# Patient Record
Sex: FEMALE | Race: BLACK OR AFRICAN AMERICAN | Employment: PART TIME | URBAN - NONMETROPOLITAN AREA
[De-identification: names, ages, dates, MRNs, and addresses within clinical notes are randomized per-mention and may not be internally consistent; named-entity substitution may affect disease eponyms.]

---
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
## 2020-07-01 ENCOUNTER — HOSPITAL ENCOUNTER (EMERGENCY)
Age: 20
Discharge: HOME | End: 2020-07-01
Payer: MEDICARE

## 2020-07-01 ENCOUNTER — APPOINTMENT (OUTPATIENT)
Dept: GENERAL RADIOLOGY | Age: 20
End: 2020-07-01
Payer: MEDICARE

## 2020-07-01 VITALS
TEMPERATURE: 98.4 F | RESPIRATION RATE: 16 BRPM | WEIGHT: 165 LBS | SYSTOLIC BLOOD PRESSURE: 139 MMHG | DIASTOLIC BLOOD PRESSURE: 86 MMHG | HEART RATE: 88 BPM | HEIGHT: 66 IN | OXYGEN SATURATION: 100 % | BODY MASS INDEX: 26.52 KG/M2

## 2020-07-01 PROCEDURE — 73610 X-RAY EXAM OF ANKLE: CPT

## 2020-07-01 PROCEDURE — 99213 OFFICE O/P EST LOW 20 MIN: CPT | Performed by: NURSE PRACTITIONER

## 2020-07-01 PROCEDURE — 99214 OFFICE O/P EST MOD 30 MIN: CPT

## 2020-07-01 RX ORDER — IBUPROFEN 400 MG/1
400 TABLET ORAL EVERY 6 HOURS PRN
Qty: 30 TABLET | Refills: 0 | Status: SHIPPED | OUTPATIENT
Start: 2020-07-01

## 2020-07-01 ASSESSMENT — ENCOUNTER SYMPTOMS
STRIDOR: 0
SHORTNESS OF BREATH: 0
WHEEZING: 0
COUGH: 0
COLOR CHANGE: 0
APNEA: 0
BACK PAIN: 0
CHEST TIGHTNESS: 0
CHOKING: 0

## 2020-07-01 NOTE — ED PROVIDER NOTES
[facility]  Urgent Care Encounter      CHIEF COMPLAINT       Chief Complaint   Patient presents with    Ankle Pain     Left       Nurses Notes reviewed and I agree except as noted in the HPI. HISTORY OFPRESENT ILLNESS   Meri ALESHIA Moore is a 21 y.o. The history is provided by the patient. No  was used. Ankle Problem   Location:  Ankle  Time since incident:  2 days  Injury: yes    Mechanism of injury comment:  Twisted stepping off porch at her home  Ankle location:  L ankle  Pain details:     Quality:  Aching    Radiates to:  Does not radiate    Severity:  Mild    Onset quality:  Sudden    Duration:  1 day    Timing:  Intermittent    Progression:  Improving  Chronicity:  New  Dislocation: no    Foreign body present:  No foreign bodies  Tetanus status:  Unknown  Prior injury to area:  No  Relieved by:  Nothing  Ineffective treatments:  Compression  Associated symptoms: no back pain, no decreased ROM, no fatigue, no fever, no itching, no muscle weakness, no neck pain, no numbness, no stiffness, no swelling and no tingling    Risk factors: no concern for non-accidental trauma, no frequent fractures, no known bone disorder, no obesity and no recent illness        REVIEW OF SYSTEMS     Review of Systems   Constitutional: Negative for activity change, appetite change, chills, diaphoresis, fatigue and fever. Respiratory: Negative for apnea, cough, choking, chest tightness, shortness of breath, wheezing and stridor. Cardiovascular: Negative for chest pain, palpitations and leg swelling. Musculoskeletal: Positive for joint swelling and myalgias. Negative for arthralgias, back pain, gait problem, neck pain, neck stiffness and stiffness. Skin: Negative for color change, itching, pallor, rash and wound. PAST MEDICAL HISTORY         Diagnosis Date    Asthma     Hypertension        SURGICAL HISTORY     Patient  has no past surgical history on file.     CURRENT MEDICATIONS       Previous Medications    LOSARTAN (COZAAR) 100 MG TABLET    TAKE 1 TABLET BY MOUTH EVERY DAY       ALLERGIES     Patient is has No Known Allergies. FAMILY HISTORY     Patient's family history includes High Blood Pressure in her father and mother. SOCIAL HISTORY     Patient  reports that she is a non-smoker but has been exposed to tobacco smoke. She has never used smokeless tobacco. She reports that she does not drink alcohol or use drugs. PHYSICAL EXAM     ED TRIAGE VITALS  BP: (!) 160/95, Temp: 98.4 °F (36.9 °C), Pulse: 93, Resp: 16, SpO2: 100 %  Physical Exam  Vitals signs and nursing note reviewed. Constitutional:       Appearance: Normal appearance. HENT:      Right Ear: External ear normal.      Left Ear: External ear normal.   Eyes:      Extraocular Movements: Extraocular movements intact. Conjunctiva/sclera: Conjunctivae normal.   Neck:      Musculoskeletal: Normal range of motion. Pulmonary:      Effort: Pulmonary effort is normal.   Musculoskeletal: Normal range of motion. Left ankle: She exhibits normal range of motion, no swelling, no ecchymosis, no deformity, no laceration and normal pulse. Tenderness. Achilles tendon normal.   Skin:     General: Skin is warm. Neurological:      General: No focal deficit present. Mental Status: She is alert and oriented to person, place, and time. Psychiatric:         Mood and Affect: Mood normal.         Behavior: Behavior normal.         Thought Content: Thought content normal.         Judgment: Judgment normal.         DIAGNOSTIC RESULTS   Labs:No results found for this visit on 07/01/20. IMAGING:  XR ANKLE LEFT (MIN 3 VIEWS)   Final Result   No acute fracture. Soft tissue swelling over the lateral malleolus. **This report has been created using voice recognition software. It may contain minor errors which are inherent in voice recognition technology. **      Final report electronically signed by  Suzan Garvin on 7/1/2020 2:41 PM        URGENT CARE COURSE:     Vitals:    07/01/20 1418   BP: (!) 160/95   Pulse: 93   Resp: 16   Temp: 98.4 °F (36.9 °C)   TempSrc: Temporal   SpO2: 100%   Weight: 165 lb (74.8 kg)   Height: 5' 6\" (1.676 m)       Medications - No data to display  PROCEDURES:  None  FINAL IMPRESSION      1. Sprain of left ankle, unspecified ligament, initial encounter        DISPOSITION/PLAN   Decision To Discharge    Compression with Wrap  Ice, elevation 15-20 minutes 3 times a day for the first 24-48 hours followed with heat 15-20 minutes 3 times a day thereafter. Activity as tolerated. Take medication as directed  Return for worsening symptoms, otherwise if symptoms persist follow up with PCP 1-2 weeks.       PATIENT REFERRED TO:  Pattricia Gosling, APRN - CNP  [place] [address]  [phone]    Schedule an appointment as soon as possible for a visit in 1 week      DISCHARGE MEDICATIONS:  New Prescriptions    MAGNESIUM SULFATE, LAXATIVE, (EPSOM SALT) GRAN    Epsom salt warm water soaks     Current Discharge Medication List      CONTINUE these medications which have CHANGED    Details   ibuprofen (IBU) 400 MG tablet Take 1 tablet by mouth every 6 hours as needed for Pain  Qty: 30 tablet, Refills: 0             ABHISHEK Child CNP, APRN - CNP  07/01/20 1510

## 2020-07-01 NOTE — ED TRIAGE NOTES
Pt to [facility] ambulatory with left ankle pain. Pt twisted left ankle yesterday while walking down some steps at home. Left ankle is swollen with no bruising present.

## 2020-07-09 ENCOUNTER — TELEPHONE (OUTPATIENT)
Dept: FAMILY MEDICINE CLINIC | Age: 20
End: 2020-07-09

## 2020-07-09 ENCOUNTER — VIRTUAL VISIT (OUTPATIENT)
Dept: FAMILY MEDICINE CLINIC | Age: 20
End: 2020-07-09
Payer: MEDICARE

## 2020-07-09 PROCEDURE — G8419 CALC BMI OUT NRM PARAM NOF/U: HCPCS | Performed by: NURSE PRACTITIONER

## 2020-07-09 PROCEDURE — 1036F TOBACCO NON-USER: CPT | Performed by: NURSE PRACTITIONER

## 2020-07-09 PROCEDURE — 99213 OFFICE O/P EST LOW 20 MIN: CPT | Performed by: NURSE PRACTITIONER

## 2020-07-09 PROCEDURE — G8428 CUR MEDS NOT DOCUMENT: HCPCS | Performed by: NURSE PRACTITIONER

## 2020-07-09 RX ORDER — LOSARTAN POTASSIUM 100 MG/1
TABLET ORAL
Qty: 90 TABLET | Refills: 4 | Status: SHIPPED | OUTPATIENT
Start: 2020-07-09 | End: 2021-07-26 | Stop reason: SDUPTHER

## 2020-07-09 ASSESSMENT — ENCOUNTER SYMPTOMS: RESPIRATORY NEGATIVE: 1

## 2020-07-09 NOTE — PROGRESS NOTES
2020    TELEHEALTH EVALUATION -- Audio/Visual (During JOODU-24 public health emergency)    HPI:  Visit conducted with pt at home and provider Vernon Savage CNP in home office. Meri ALESHIA Katerine Harvey (:  2000) has requested an audio/video evaluation for the following concern(s):    HTN    Does patient check BP regularly at home? - No  Current Medication regimen - cozaar 100 mg daily  Tolerating medications well? - yes    Shortness of breath or chest pain? No  Headache or visual complaints? No  Neurologic changes like confusion? No  Extremity edema? No      Pt recently went to urgent care with left ankle ain and swelling, she had injured her ankle, she was given meds for this and has been using ice, states the ankle pain and swelling are improving. We discussed her periods states they are regular and not painful, she is nor on birth control and states she is not sexually active, declined birth control. BP Readings from Last 3 Encounters:   20 139/86   19 138/68   19 (!) 153/97       Review of Systems   Constitutional: Negative for fatigue and fever. HENT: Negative for congestion and dental problem. Respiratory: Negative. Cardiovascular: Negative. Genitourinary: Negative for difficulty urinating, dysuria and menstrual problem. Musculoskeletal: Positive for arthralgias and myalgias. Skin: Negative. Neurological: Negative for dizziness, weakness and headaches. Psychiatric/Behavioral: Negative for self-injury, sleep disturbance and suicidal ideas. Prior to Visit Medications    Medication Sig Taking?  Authorizing Provider   losartan (COZAAR) 100 MG tablet TAKE 1 TABLET BY MOUTH EVERY DAY Yes ABHISHEK Marin CNP   ibuprofen (IBU) 400 MG tablet Take 1 tablet by mouth every 6 hours as needed for Pain  Terra Hatchet, APRN - CNP   Magnesium Sulfate, Laxative, (EPSOM SALT) GRAN Epsom salt warm water soaks  Terra Hatchet, APRN - CNP       Social History Tobacco Use    Smoking status: Passive Smoke Exposure - Never Smoker    Smokeless tobacco: Never Used   Substance Use Topics    Alcohol use: No    Drug use: No        No Known Allergies,   Past Medical History:   Diagnosis Date    Asthma     Hypertension    , No past surgical history on file.,   Social History     Tobacco Use    Smoking status: Passive Smoke Exposure - Never Smoker    Smokeless tobacco: Never Used   Substance Use Topics    Alcohol use: No    Drug use: No   ,   Family History   Problem Relation Age of Onset    High Blood Pressure Mother     High Blood Pressure Father    ,   Immunization History   Administered Date(s) Administered    Influenza Virus Vaccine 10/21/2013    Tdap (Boostrix, Adacel) 02/23/2011   ,   Health Maintenance   Topic Date Due    HPV vaccine (1 - 2-dose series) 02/08/2011    HIV screen  02/08/2015    Chlamydia screen  02/08/2016    Potassium monitoring  01/03/2020    Creatinine monitoring  01/03/2020    Flu vaccine (1) 09/01/2020    DTaP/Tdap/Td vaccine (2 - Td) 02/23/2021    Hepatitis A vaccine  Aged Out    Hepatitis B vaccine  Aged Out    Hib vaccine  Aged Out    Meningococcal (ACWY) vaccine  Aged Out    Pneumococcal 0-64 years Vaccine  Aged Out    Varicella vaccine  Discontinued       PHYSICAL EXAMINATION:  Vital Signs: (As obtained by patient/caregiver or practitioner observation)    Blood pressure- 139/86 Heart rate-  88  Respiratory rate-18   Temperature-  Pulse oximetry-     Constitutional: [x] Appears well-developed and well-nourished [x] No apparent distress      [x] Mental status  [x] Alert and awake  [x] Oriented to person/place/time []Eyes:  Sclera  [x]  Normal  [] Abnormal -         Discharge [x]  None visible  [] Abnormal -    HENT:   [x] Normocephalic, atraumatic.   [] [x] Mouth/Throat: Mucous membranes are moist.       Pulmonary/Chest: [x] Respiratory effort normal.  [x] No visualized signs of difficulty breathing or respiratory in-person clinic visit. Patient and provider were located at their individual homes. --ABHISHEK Gasca CNP on 7/9/2020 at 11:00 AM    An electronic signature was used to authenticate this note.

## 2020-07-16 ENCOUNTER — HOSPITAL ENCOUNTER (OUTPATIENT)
Age: 20
Discharge: HOME | End: 2020-07-16
Payer: MEDICARE

## 2020-07-16 DIAGNOSIS — I10 ESSENTIAL HYPERTENSION: ICD-10-CM

## 2020-07-16 LAB
ALBUMIN SERPL-MCNC: 4.5 G/DL (ref 3.5–5.1)
ALP BLD-CCNC: 65 U/L (ref 38–126)
ALT SERPL-CCNC: 12 U/L (ref 11–66)
ANION GAP SERPL CALCULATED.3IONS-SCNC: 12 MEQ/L (ref 8–16)
AST SERPL-CCNC: 15 U/L (ref 5–40)
BASOPHILS # BLD: 0.4 %
BASOPHILS ABSOLUTE: 0 THOU/MM3 (ref 0–0.1)
BILIRUB SERPL-MCNC: 0.2 MG/DL (ref 0.3–1.2)
BUN BLDV-MCNC: 16 MG/DL (ref 7–22)
CALCIUM SERPL-MCNC: 10.2 MG/DL (ref 8.5–10.5)
CHLORIDE BLD-SCNC: 103 MEQ/L (ref 98–111)
CHOLESTEROL, TOTAL: 153 MG/DL (ref 100–199)
CO2: 25 MEQ/L (ref 23–33)
CREAT SERPL-MCNC: 0.6 MG/DL (ref 0.4–1.2)
EOSINOPHIL # BLD: 0.7 %
EOSINOPHILS ABSOLUTE: 0.1 THOU/MM3 (ref 0–0.4)
ERYTHROCYTE [DISTWIDTH] IN BLOOD BY AUTOMATED COUNT: 13.9 % (ref 11.5–14.5)
ERYTHROCYTE [DISTWIDTH] IN BLOOD BY AUTOMATED COUNT: 42.5 FL (ref 35–45)
GFR SERPL CREATININE-BSD FRML MDRD: > 90 ML/MIN/1.73M2
GLUCOSE BLD-MCNC: 94 MG/DL (ref 70–108)
HCT VFR BLD CALC: 38.6 % (ref 37–47)
HDLC SERPL-MCNC: 36 MG/DL
HEMOGLOBIN: 12.7 GM/DL (ref 12–16)
IMMATURE GRANS (ABS): 0.01 THOU/MM3 (ref 0–0.07)
IMMATURE GRANULOCYTES: 0.1 %
LDL CHOLESTEROL CALCULATED: 83 MG/DL
LYMPHOCYTES # BLD: 27.3 %
LYMPHOCYTES ABSOLUTE: 2 THOU/MM3 (ref 1–4.8)
MCH RBC QN AUTO: 27.6 PG (ref 26–33)
MCHC RBC AUTO-ENTMCNC: 32.9 GM/DL (ref 32.2–35.5)
MCV RBC AUTO: 83.9 FL (ref 81–99)
MONOCYTES # BLD: 7 %
MONOCYTES ABSOLUTE: 0.5 THOU/MM3 (ref 0.4–1.3)
NUCLEATED RED BLOOD CELLS: 0 /100 WBC
PLATELET # BLD: 250 THOU/MM3 (ref 130–400)
PMV BLD AUTO: 12.9 FL (ref 9.4–12.4)
POTASSIUM SERPL-SCNC: 4.2 MEQ/L (ref 3.5–5.2)
RBC # BLD: 4.6 MILL/MM3 (ref 4.2–5.4)
SEG NEUTROPHILS: 64.5 %
SEGMENTED NEUTROPHILS ABSOLUTE COUNT: 4.7 THOU/MM3 (ref 1.8–7.7)
SODIUM BLD-SCNC: 140 MEQ/L (ref 135–145)
TOTAL PROTEIN: 7.3 G/DL (ref 6.1–8)
TRIGL SERPL-MCNC: 170 MG/DL (ref 0–199)
WBC # BLD: 7.3 THOU/MM3 (ref 4.8–10.8)

## 2020-07-16 PROCEDURE — 36415 COLL VENOUS BLD VENIPUNCTURE: CPT

## 2020-07-16 PROCEDURE — 80061 LIPID PANEL: CPT

## 2020-07-16 PROCEDURE — 85025 COMPLETE CBC W/AUTO DIFF WBC: CPT

## 2020-07-16 PROCEDURE — 80053 COMPREHEN METABOLIC PANEL: CPT

## 2020-07-17 ENCOUNTER — TELEPHONE (OUTPATIENT)
Dept: FAMILY MEDICINE CLINIC | Age: 20
End: 2020-07-17

## 2020-12-29 ENCOUNTER — OFFICE VISIT (OUTPATIENT)
Dept: FAMILY MEDICINE CLINIC | Age: 20
End: 2020-12-29
Payer: MEDICARE

## 2020-12-29 VITALS
DIASTOLIC BLOOD PRESSURE: 88 MMHG | RESPIRATION RATE: 12 BRPM | HEIGHT: 66 IN | OXYGEN SATURATION: 100 % | TEMPERATURE: 98.6 F | SYSTOLIC BLOOD PRESSURE: 150 MMHG | BODY MASS INDEX: 29.25 KG/M2 | WEIGHT: 182 LBS | HEART RATE: 85 BPM

## 2020-12-29 PROCEDURE — G8484 FLU IMMUNIZE NO ADMIN: HCPCS | Performed by: NURSE PRACTITIONER

## 2020-12-29 PROCEDURE — 99395 PREV VISIT EST AGE 18-39: CPT | Performed by: NURSE PRACTITIONER

## 2020-12-29 PROCEDURE — 90471 IMMUNIZATION ADMIN: CPT | Performed by: NURSE PRACTITIONER

## 2020-12-29 PROCEDURE — 90651 9VHPV VACCINE 2/3 DOSE IM: CPT | Performed by: NURSE PRACTITIONER

## 2020-12-29 ASSESSMENT — PATIENT HEALTH QUESTIONNAIRE - PHQ9
SUM OF ALL RESPONSES TO PHQ QUESTIONS 1-9: 0
2. FEELING DOWN, DEPRESSED OR HOPELESS: 0
SUM OF ALL RESPONSES TO PHQ QUESTIONS 1-9: 0
SUM OF ALL RESPONSES TO PHQ QUESTIONS 1-9: 0
1. LITTLE INTEREST OR PLEASURE IN DOING THINGS: 0
SUM OF ALL RESPONSES TO PHQ9 QUESTIONS 1 & 2: 0

## 2020-12-29 NOTE — PROGRESS NOTES
Chief Complaint   Patient presents with    Annual Exam     physical for [facility]         SUBJECTIVE:  Rolly Vernon is a 21 y.o. female for physical exam.    Diet - eats a wide variety of foods  Exercise - is physically active, works everyday in [employer]  Sleep - sleeps well    HTN    Does patient check BP regularly at home? - No  Current Medication regimen - cozaar 100 mg daily  Tolerating medications well? - yes    Shortness of breath or chest pain? No  Headache or visual complaints? No  Neurologic changes like confusion? No  Extremity edema? No    Did not take her bp meds today. Denies headaches or dizziness or vision changes. I have reviewed pt immunization record and all are up to date other than HPV, will give today. Pt also had her TB tests this year. Paperwork updated. BP Readings from Last 3 Encounters:   12/29/20 (!) 150/88   07/01/20 139/86   11/26/19 138/68         Health Maintenance reviewed with patient today. Past Medical History:   Diagnosis Date    Asthma     Hypertension        History reviewed. No pertinent surgical history.     Social History     Socioeconomic History    Marital status: Single     Spouse name: Not on file    Number of children: Not on file    Years of education: Not on file    Highest education level: Not on file   Occupational History    Not on file   Social Needs    Financial resource strain: Not on file    Food insecurity     Worry: Not on file     Inability: Not on file    Transportation needs     Medical: Not on file     Non-medical: Not on file   Tobacco Use    Smoking status: Passive Smoke Exposure - Never Smoker    Smokeless tobacco: Never Used   Substance and Sexual Activity    Alcohol use: No    Drug use: No    Sexual activity: Never   Lifestyle    Physical activity     Days per week: Not on file     Minutes per session: Not on file    Stress: Not on file   Relationships    Social connections     Talks on phone: Not on file Gets together: Not on file     Attends Buddhism service: Not on file     Active member of club or organization: Not on file     Attends meetings of clubs or organizations: Not on file     Relationship status: Not on file    Intimate partner violence     Fear of current or ex partner: Not on file     Emotionally abused: Not on file     Physically abused: Not on file     Forced sexual activity: Not on file   Other Topics Concern    Not on file   Social History Narrative    Not on file       Family History   Problem Relation Age of Onset    High Blood Pressure Mother     High Blood Pressure Father            I have reviewed the patient's past medical history, past surgical history, allergies, medications, social and family history and I have made updates where appropriate.     Review of Systems  Positive responses are highlighted in bold    Constitutional:  Fever, Chills, Fatigue, Unexpected changes in weight  Eyes:  Eye discharge, Eye pain, Eye redness, Visual disturbances   HENT:  Ear pain, Tinnitus, Nosebleeds, Trouble swallowing  Cardiovascular:  Chest Pain, Palpitations  Respiratory:  Cough, Wheezing, Shortness of breath, Chest tightness, Apnea  Gastrointestinal:  Nausea, Vomiting, Diarrhea, Constipation, Heartburn, Blood in stool  Genitourinary:  Difficulty or painful urination, Flank pain, Change in frequency, Urgency  Skin:  Color change, Rash, Itching, Wound  Psychiatric:  Hallucinations, Anxiety, Depression, Suicidal ideation  Hematological:  Enlarged glands, Easy bleeding, Easily bruising  Musculoskeletal:  Joint pain, Back pain, Gait problems, Joint swelling, Myalgias  Neurological:  Dizziness, Headaches, Presyncope, Numbness, Seizures, Tremors  Allergy:  Environmental allergies, Food allergies  Endocrine:  Heat Intolerance, Cold Intolerance, Polydipsia, Polyphagia, Polyuria      PHYSICAL EXAM:  Vitals:    12/29/20 1120   BP: (!) 150/88   Pulse:    Resp:    Temp:    SpO2: General Appearance: well developed and well- nourished, in no acute distress  Head: normocephalic and atraumatic  Eyes: pupils equal, round, and reactive to light, extraocular eye movements intact, conjunctivae and eye lids without erythema  ENT: external ear and ear canal normal bilaterally, TMs intact and regular, nose without deformity, nasal mucosa and turbinates normal without polyps, oropharynx normal, dentition is normal for age  Neck: supple and non-tender without mass, no thyromegaly or thyroid nodules, no cervical lymphadenopathy  Pulmonary/Chest: clear to auscultation bilaterally- no wheezes, rales or rhonchi, normal air movement, no respiratory distress or retractions  Cardiovascular: normal rate, regular rhythm, normal S1 and S2, no murmurs, rubs, clicks, or gallops, distal pulses intact, no carotid bruits  Abdomen: soft, non-tender, non-distended, normal bowel sounds,  no rebound or guarding, no masses or hernias noted  Extremities: no cyanosis, clubbing or edema of the lower extremities  Musculoskeletal: No joint swelling or gross deformity   Neuro:  Alert, 5/5 strength globally and symmetrically  Skin: warm and dry, no rash or erythema  Psych:  Affect appropriate. Thought process is normal without evidence of depression or psychosis. Good insight and appropriae interaction. Cognition and memory appear to be intact. ASSESSMENT & PLAN  Meri was seen today for annual exam.    Diagnoses and all orders for this visit:    School physical exam  Paperwork completed and form filled out. Immunization due    Essential hypertension  Take bp meds and then come back for bp check   Other orders  -     HPV vaccine 9-valent IM (GARDASIL 9)    Pt in agreement with plan     Return in about 2 weeks (around 1/12/2021) for 2 week nurse visit for bp check and 2 month nurse visit for 2nd hpv . Counseling was provided today regarding the following topics: Healthy eating habits, Regular exercise, substance abuse and healthy sleep habits.

## 2020-12-29 NOTE — PROGRESS NOTES
Immunization(s) given during visit:    Immunizations Administered     Name Date Dose Route    HPV 9-valent Vilinda Layer) 12/29/2020 0.5 mL Intramuscular    Site: Deltoid- Right    Lot: [id number]    NDC: 1334-5266-68          Most recent Vaccine Information Sheet dated 10/30/19 given to isaias

## 2021-01-12 ENCOUNTER — TELEPHONE (OUTPATIENT)
Dept: FAMILY MEDICINE CLINIC | Age: 21
End: 2021-01-12

## 2021-01-12 ENCOUNTER — NURSE ONLY (OUTPATIENT)
Dept: FAMILY MEDICINE CLINIC | Age: 21
End: 2021-01-12

## 2021-01-12 VITALS
HEART RATE: 68 BPM | SYSTOLIC BLOOD PRESSURE: 124 MMHG | RESPIRATION RATE: 10 BRPM | OXYGEN SATURATION: 99 % | DIASTOLIC BLOOD PRESSURE: 72 MMHG

## 2021-01-12 DIAGNOSIS — I10 ESSENTIAL HYPERTENSION: Primary | ICD-10-CM

## 2021-01-12 ASSESSMENT — PATIENT HEALTH QUESTIONNAIRE - PHQ9
1. LITTLE INTEREST OR PLEASURE IN DOING THINGS: 0
SUM OF ALL RESPONSES TO PHQ QUESTIONS 1-9: 0
SUM OF ALL RESPONSES TO PHQ QUESTIONS 1-9: 0

## 2021-01-12 NOTE — TELEPHONE ENCOUNTER
Pt presented to office for a B/P check . Denies any c/o. Pt states she has been taking medication as prescribed.      B/P  124/72,68  Rt arm med cuff sitting     Pharmacy [facility]

## 2021-01-12 NOTE — PROGRESS NOTES
Pt presented to office for a B/P check . Denies any c/o. Pt states she has been taking medication as prescribed.      B/P  124/72,68  Rt arm med cuff sitting     Pharmacy [facility]

## 2021-01-25 ENCOUNTER — TELEPHONE (OUTPATIENT)
Dept: FAMILY MEDICINE CLINIC | Age: 21
End: 2021-01-25

## 2021-01-25 NOTE — TELEPHONE ENCOUNTER
Future Appointments   Date Time Provider Sammie Martinez   2/3/2021  1:00 PM SCHEDULE, NURSE Gretchen Grace [facility] - [place]   3/1/2021 10:00 AM SCHEDULE, NURSE Gretchen Grace 94 [facility] - [place]   7/12/2021 11:20 AM ABHISHEK Wallace - [facility] [address]

## 2021-02-03 ENCOUNTER — NURSE ONLY (OUTPATIENT)
Dept: FAMILY MEDICINE CLINIC | Age: 21
End: 2021-02-03
Payer: MEDICARE

## 2021-02-03 DIAGNOSIS — Z23 NEED FOR TDAP VACCINATION: Primary | ICD-10-CM

## 2021-02-03 PROCEDURE — 90471 IMMUNIZATION ADMIN: CPT | Performed by: NURSE PRACTITIONER

## 2021-02-03 PROCEDURE — 90715 TDAP VACCINE 7 YRS/> IM: CPT | Performed by: NURSE PRACTITIONER

## 2021-02-03 NOTE — PROGRESS NOTES
Immunization(s) given during visit:    Immunizations Administered     Name Date Dose Route    Tdap (Boostrix, Adacel) 2/3/2021 0.5 mL Intramuscular    Site: Deltoid- Right    Lot: [id number]    NDC: 85242-541-34          Most recent Vaccine Information Sheet dated 4/1/20 given to isaias

## 2021-03-01 ENCOUNTER — NURSE ONLY (OUTPATIENT)
Dept: FAMILY MEDICINE CLINIC | Age: 21
End: 2021-03-01
Payer: MEDICARE

## 2021-03-01 DIAGNOSIS — Z23 NEED FOR HPV VACCINE: Primary | ICD-10-CM

## 2021-03-01 PROCEDURE — 90471 IMMUNIZATION ADMIN: CPT | Performed by: NURSE PRACTITIONER

## 2021-03-01 PROCEDURE — 90651 9VHPV VACCINE 2/3 DOSE IM: CPT | Performed by: NURSE PRACTITIONER

## 2021-03-01 NOTE — PROGRESS NOTES
Immunization(s) given during visit:    Immunizations Administered     Name Date Dose Route    HPV 9-valent Hank Margarita) 3/1/2021 0.5 mL Intramuscular    Site: Deltoid- Right    Lot: [id number]    NDC: 7854-9777-66          Most recent Vaccine Information Sheet dated 10/30/19 given to isaias

## 2021-03-11 ENCOUNTER — TELEPHONE (OUTPATIENT)
Dept: FAMILY MEDICINE CLINIC | Age: 21
End: 2021-03-11

## 2021-03-11 NOTE — TELEPHONE ENCOUNTER
Pt scheduled for appt Tue 3/16/21 with Devyn.  Devyn requested that I call pt to see if they would like to come to appt today. Pt states she is unable to come to appt today.

## 2021-03-16 ENCOUNTER — OFFICE VISIT (OUTPATIENT)
Dept: FAMILY MEDICINE CLINIC | Age: 21
End: 2021-03-16
Payer: MEDICARE

## 2021-03-16 VITALS
OXYGEN SATURATION: 98 % | DIASTOLIC BLOOD PRESSURE: 88 MMHG | SYSTOLIC BLOOD PRESSURE: 140 MMHG | BODY MASS INDEX: 29.91 KG/M2 | RESPIRATION RATE: 14 BRPM | TEMPERATURE: 97.4 F | HEART RATE: 110 BPM | HEIGHT: 67 IN | WEIGHT: 190.6 LBS

## 2021-03-16 DIAGNOSIS — R00.0 TACHYCARDIA: Primary | ICD-10-CM

## 2021-03-16 DIAGNOSIS — Z30.41 SURVEILLANCE FOR BIRTH CONTROL, ORAL CONTRACEPTIVES: ICD-10-CM

## 2021-03-16 DIAGNOSIS — R01.1 HEART MURMUR: ICD-10-CM

## 2021-03-16 DIAGNOSIS — I10 ESSENTIAL HYPERTENSION: ICD-10-CM

## 2021-03-16 PROCEDURE — G8427 DOCREV CUR MEDS BY ELIG CLIN: HCPCS | Performed by: NURSE PRACTITIONER

## 2021-03-16 PROCEDURE — 1036F TOBACCO NON-USER: CPT | Performed by: NURSE PRACTITIONER

## 2021-03-16 PROCEDURE — G8417 CALC BMI ABV UP PARAM F/U: HCPCS | Performed by: NURSE PRACTITIONER

## 2021-03-16 PROCEDURE — 93000 ELECTROCARDIOGRAM COMPLETE: CPT | Performed by: NURSE PRACTITIONER

## 2021-03-16 PROCEDURE — G8482 FLU IMMUNIZE ORDER/ADMIN: HCPCS | Performed by: NURSE PRACTITIONER

## 2021-03-16 PROCEDURE — 99214 OFFICE O/P EST MOD 30 MIN: CPT | Performed by: NURSE PRACTITIONER

## 2021-03-16 RX ORDER — NORGESTIMATE AND ETHINYL ESTRADIOL 0.25-0.035
1 KIT ORAL DAILY
Qty: 3 PACKET | Refills: 0 | Status: SHIPPED | OUTPATIENT
Start: 2021-03-16 | End: 2021-06-02

## 2021-03-16 ASSESSMENT — ENCOUNTER SYMPTOMS
RESPIRATORY NEGATIVE: 1
BACK PAIN: 0

## 2021-03-16 NOTE — PROGRESS NOTES
[address] [place] [address]  [address] [place] [address]  Dept: [phone]  Dept Fax: [phone]  Loc: [phone]    Loly Rodríguez is a 24 y.o. femalewho presents today for her medical conditions/complaints as noted below. Meri D Tomlinsonis c/o of Contraception (wants to discuss getting on birth control)      HPI:      Pt here to discuss birth control. Pt states she is not sexually active, has never been sexually active but has become close to someone and would like to start birth control pills. States she has 1 period every month, denies heavy cramping or bleeding with the periods. Denies vaginal discharge or itching or burning. HTN    Does patient check BP regularly at home? - No  Current Medication regimen - cozaar 100 mg daily   Tolerating medications well? - yes    Shortness of breath or chest pain? No  Headache or visual complaints? No  Neurologic changes like confusion? No  Extremity edema? No    BP Readings from Last 3 Encounters:  03/16/21 : (!) 140/88  01/12/21 : 124/72  12/29/20 : (!) 150/88  It is noted today her heart rate was 110 EKG performed and heart rate confirmed at 93 with NSR Pt denies chest pain or heart palpitations or sob or dizziness. Denies pedal edema. Current Outpatient Medications   Medication Sig Dispense Refill    norgestimate-ethinyl estradiol (ORTHO-CYCLEN, 28,) 0.25-35 MG-MCG per tablet Take 1 tablet by mouth daily 3 packet 0    losartan (COZAAR) 100 MG tablet TAKE 1 TABLET BY MOUTH EVERY DAY 90 tablet 4    ibuprofen (IBU) 400 MG tablet Take 1 tablet by mouth every 6 hours as needed for Pain 30 tablet 0     No current facility-administered medications for this visit. Past Medical History:   Diagnosis Date    Asthma     Hypertension       History reviewed. No pertinent surgical history.   Family History   Problem Relation Age of Onset    High Blood Pressure Mother     High Blood Pressure Father      Social History     Tobacco Use    Smoking status: Passive Smoke Exposure - Never Smoker    Smokeless tobacco: Never Used   Substance Use Topics    Alcohol use: No        No Known Allergies    Health Maintenance   Topic Date Due    Hepatitis C screen  Never done    HIV screen  Never done    Chlamydia screen  Never done    Cervical cancer screen  Never done    Potassium monitoring  07/16/2021    Creatinine monitoring  07/16/2021    DTaP/Tdap/Td vaccine (8 - Td) 02/03/2031    Hepatitis A vaccine  Completed    Hepatitis B vaccine  Completed    Hib vaccine  Completed    HPV vaccine  Completed    Meningococcal (ACWY) vaccine  Completed    Flu vaccine  Completed    Pneumococcal 0-64 years Vaccine  Aged Out    Varicella vaccine  Discontinued       Subjective:      Review of Systems   Constitutional: Negative for chills and fatigue. HENT: Negative. Respiratory: Negative. Cardiovascular: Negative. Genitourinary: Negative for difficulty urinating, dysuria, flank pain and menstrual problem. Musculoskeletal: Negative for arthralgias and back pain. Skin: Negative. Neurological: Negative for dizziness, facial asymmetry, weakness and headaches. Psychiatric/Behavioral: Negative for self-injury, sleep disturbance and suicidal ideas. Objective:      BP (!) 140/88   Pulse 110   Temp 97.4 °F (36.3 °C) (Temporal)   Resp 14   Ht 5' 6.5\" (1.689 m)   Wt 190 lb 9.6 oz (86.5 kg)   LMP 02/20/2021 (Approximate)   SpO2 98%   BMI 30.30 kg/m²      Physical Exam  Vitals signs and nursing note reviewed. Constitutional:       Appearance: She is not ill-appearing. Cardiovascular:      Rate and Rhythm: Regular rhythm. Tachycardia present. Pulses: Normal pulses. Heart sounds: Murmur (grade II murmur auscultated) present. Systolic murmur present with a grade of 2/6. Pulmonary:      Effort: Pulmonary effort is normal. No respiratory distress.       Breath sounds: Normal breath sounds. No wheezing. Abdominal:      General: Abdomen is flat. Bowel sounds are normal.      Palpations: Abdomen is soft. Musculoskeletal:      Right lower leg: No edema. Left lower leg: No edema. Skin:     General: Skin is warm and dry. Capillary Refill: Capillary refill takes less than 2 seconds. Neurological:      Mental Status: She is alert. Psychiatric:         Mood and Affect: Mood normal.         Behavior: Behavior normal.         Thought Content: Thought content normal.         Judgment: Judgment normal.          Assessment/Plan:           1. Tachycardia  Rule out SVT vs anxiety  - EKG 12 Lead  - XR CHEST STANDARD (2 VW); Future  - Holter Monitor 48 Hour; Future    2. Essential hypertension  Continue with cozaar   Rule out cardiac enlargement   - XR CHEST STANDARD (2 VW); Future    3. Heart murmur    - ECHO Complete 2D W Doppler W Color; Future  Birth Control surveillance  Start sprintec  Return in about 3 months (around 6/16/2021) for f/u bp and  new med. Reccommended tobaccocessation options including pharmacologic methods, counseled great than 3 minutesduring this visit:  Yes[]  No  []       Patient given educational materials -see patient instructions. Discussed use, benefit, and side effects of prescribedmedications. All patient questions answered. Pt voiced understanding. Reviewedhealth maintenance. Instructed to continue current medications, diet and exercise. Patient agreed with treatment plan. Follow up as directed.        Electronicallysigned by ABHISHEK Zaragoza CNP on 3/16/2021 at 5:27 PM

## 2021-04-06 ENCOUNTER — HOSPITAL ENCOUNTER (OUTPATIENT)
Dept: NON INVASIVE DIAGNOSTICS | Age: 21
Discharge: HOME | End: 2021-04-06
Payer: MEDICARE

## 2021-04-06 ENCOUNTER — HOSPITAL ENCOUNTER (OUTPATIENT)
Dept: GENERAL RADIOLOGY | Age: 21
Discharge: HOME | End: 2021-04-06
Payer: MEDICARE

## 2021-04-06 ENCOUNTER — HOSPITAL ENCOUNTER (OUTPATIENT)
Age: 21
Discharge: HOME | End: 2021-04-06
Payer: MEDICARE

## 2021-04-06 DIAGNOSIS — R00.0 TACHYCARDIA: ICD-10-CM

## 2021-04-06 DIAGNOSIS — R01.1 HEART MURMUR: ICD-10-CM

## 2021-04-06 DIAGNOSIS — I10 ESSENTIAL HYPERTENSION: ICD-10-CM

## 2021-04-06 LAB
LV EF: 60 %
LVEF MODALITY: NORMAL

## 2021-04-06 PROCEDURE — 93225 XTRNL ECG REC<48 HRS REC: CPT

## 2021-04-06 PROCEDURE — 71046 X-RAY EXAM CHEST 2 VIEWS: CPT

## 2021-04-06 PROCEDURE — 93226 XTRNL ECG REC<48 HR SCAN A/R: CPT

## 2021-04-06 PROCEDURE — 93306 TTE W/DOPPLER COMPLETE: CPT

## 2021-04-07 ENCOUNTER — TELEPHONE (OUTPATIENT)
Dept: FAMILY MEDICINE CLINIC | Age: 21
End: 2021-04-07

## 2021-04-07 NOTE — TELEPHONE ENCOUNTER
----- Message from ABHISHEK Yao CNP sent at 4/7/2021  7:24 AM EDT -----  Let pt know her echo results are normal, all heart chambers and valves are working good, we will be in touch with her when the holter test is back.

## 2021-04-12 LAB
ACQUISITION DURATION: NORMAL S
AVERAGE HEART RATE: 82 BPM
HOOKUP DATE: NORMAL
HOOKUP TIME: NORMAL
MAX HEART RATE TIME/DATE: NORMAL
MAX HEART RATE: 145 BPM
MIN HEART RATE TIME/DATE: NORMAL
MIN HEART RATE: 61 BPM
NUMBER OF QRS COMPLEXES: NORMAL
NUMBER OF SUPRAVENTRICULAR COUPLETS: 0
NUMBER OF SUPRAVENTRICULAR ECTOPICS: 2
NUMBER OF SUPRAVENTRICULAR ISOLATED BEATS: 2
NUMBER OF VENTRICULAR BIGEMINAL CYCLES: 0
NUMBER OF VENTRICULAR COUPLETS: 1
NUMBER OF VENTRICULAR ECTOPICS: 3

## 2021-04-13 ENCOUNTER — TELEPHONE (OUTPATIENT)
Dept: FAMILY MEDICINE CLINIC | Age: 21
End: 2021-04-13

## 2021-04-13 NOTE — TELEPHONE ENCOUNTER
----- Message from ABHISHEK Olivas CNP sent at 4/13/2021  8:59 AM EDT -----  Please let pt know her holter monitor test was normal the conclusion was normal sinus rhythm, she did have some fast beats up to 145 but they did not carry on for long. Let me know if she has any questions.

## 2021-07-26 ENCOUNTER — OFFICE VISIT (OUTPATIENT)
Dept: FAMILY MEDICINE CLINIC | Age: 21
End: 2021-07-26
Payer: MEDICARE

## 2021-07-26 VITALS
HEIGHT: 66 IN | RESPIRATION RATE: 16 BRPM | SYSTOLIC BLOOD PRESSURE: 152 MMHG | WEIGHT: 200.8 LBS | DIASTOLIC BLOOD PRESSURE: 84 MMHG | BODY MASS INDEX: 32.27 KG/M2 | HEART RATE: 68 BPM

## 2021-07-26 DIAGNOSIS — B36.0 TINEA VERSICOLOR: ICD-10-CM

## 2021-07-26 DIAGNOSIS — Z13.29 SCREENING FOR THYROID DISORDER: ICD-10-CM

## 2021-07-26 DIAGNOSIS — I10 ESSENTIAL HYPERTENSION: ICD-10-CM

## 2021-07-26 DIAGNOSIS — Z00.00 PHYSICAL EXAM: Primary | ICD-10-CM

## 2021-07-26 PROCEDURE — 1036F TOBACCO NON-USER: CPT | Performed by: NURSE PRACTITIONER

## 2021-07-26 PROCEDURE — 99214 OFFICE O/P EST MOD 30 MIN: CPT | Performed by: NURSE PRACTITIONER

## 2021-07-26 PROCEDURE — G8427 DOCREV CUR MEDS BY ELIG CLIN: HCPCS | Performed by: NURSE PRACTITIONER

## 2021-07-26 PROCEDURE — 99395 PREV VISIT EST AGE 18-39: CPT | Performed by: NURSE PRACTITIONER

## 2021-07-26 PROCEDURE — G8417 CALC BMI ABV UP PARAM F/U: HCPCS | Performed by: NURSE PRACTITIONER

## 2021-07-26 RX ORDER — SALICYLIC ACID 0.03 G/ML
1 SHAMPOO TOPICAL DAILY
Qty: 236 ML | Refills: 3 | Status: SHIPPED | OUTPATIENT
Start: 2021-07-26

## 2021-07-26 RX ORDER — LOSARTAN POTASSIUM 100 MG/1
TABLET ORAL
Qty: 90 TABLET | Refills: 4 | Status: SHIPPED | OUTPATIENT
Start: 2021-07-26 | End: 2022-08-16 | Stop reason: SDUPTHER

## 2021-07-26 RX ORDER — AMLODIPINE BESYLATE 5 MG/1
5 TABLET ORAL DAILY
Qty: 90 TABLET | Refills: 1 | Status: SHIPPED | OUTPATIENT
Start: 2021-07-26 | End: 2022-01-20

## 2021-07-26 RX ORDER — PRENATAL VIT 91/IRON/FOLIC/DHA 28-975-200
COMBINATION PACKAGE (EA) ORAL
Qty: 42 G | Refills: 2 | Status: SHIPPED | OUTPATIENT
Start: 2021-07-26

## 2021-07-26 RX ORDER — FLUCONAZOLE 100 MG/1
100 TABLET ORAL DAILY
Qty: 6 TABLET | Refills: 0 | Status: SHIPPED | OUTPATIENT
Start: 2021-07-26 | End: 2021-08-02 | Stop reason: SDUPTHER

## 2021-07-26 SDOH — ECONOMIC STABILITY: FOOD INSECURITY: WITHIN THE PAST 12 MONTHS, YOU WORRIED THAT YOUR FOOD WOULD RUN OUT BEFORE YOU GOT MONEY TO BUY MORE.: NEVER TRUE

## 2021-07-26 SDOH — ECONOMIC STABILITY: FOOD INSECURITY: WITHIN THE PAST 12 MONTHS, THE FOOD YOU BOUGHT JUST DIDN'T LAST AND YOU DIDN'T HAVE MONEY TO GET MORE.: NEVER TRUE

## 2021-07-26 ASSESSMENT — SOCIAL DETERMINANTS OF HEALTH (SDOH): HOW HARD IS IT FOR YOU TO PAY FOR THE VERY BASICS LIKE FOOD, HOUSING, MEDICAL CARE, AND HEATING?: NOT HARD AT ALL

## 2021-07-26 NOTE — PATIENT INSTRUCTIONS
Patient Education        Tinea Versicolor: Care Instructions  Your Care Instructions  Tinea versicolor is a skin infection caused by a yeast (fungus). It causes many small spots, usually on the chest and back. The spotted skin can be flaky or scaly. The spots do not tan in the sun, so they are lighter than the skin around them. Some spots may be darker than the skin around them. The yeast that causes tinea versicolor normally lives on your skin. But it becomes a problem only when warmth and humidity allow the yeast to grow rapidly and increase in number. Some people are more likely to get tinea versicolor. It does not spread from person to person. Tinea versicolor usually gets better as you age. You can treat tinea versicolor with cream or ointment that kills the yeast. You may need pills to kill the fungus if the spots cover a lot of your body. Although treatment kills the yeast quickly, your skin may not return to normal for months after treatment. You can get this condition again after treatment. Follow-up care is a key part of your treatment and safety. Be sure to make and go to all appointments, and call your doctor if you are having problems. It's also a good idea to know your test results and keep a list of the medicines you take. How can you care for yourself at home? · Follow the directions for use of creams, shampoos, or solutions. You will probably need to use them for 1 to 2 weeks. If your skin gets irritated, stop using the product, and call your doctor. · To prevent tinea versicolor, use a cream, shampoo, or solution one time a month. Your doctor may prescribe pills to prevent the spots from returning. · Dry off well after bathing. Keep your skin clean and dry. · Always wear sunscreen on exposed skin. Make sure to use a broad-spectrum sunscreen that has a sun protection factor (SPF) of 30 or higher. Use it every day, even when it is cloudy.   · If you keep getting tinea versicolor, wash your clothes in very hot water to kill the yeast.  When should you call for help? Call your doctor now or seek immediate medical care if:    · You have signs of infection such as:  ? Pain, warmth, or swelling in your skin. ? Red streaks near a wound in your skin. ? Pus coming from a wound in your skin. ? A fever. Watch closely for changes in your health, and be sure to contact your doctor if:    · Your skin condition does not improve in 2 weeks.     · You do not get better as expected. Where can you learn more? Go to https://basico.compepiceweb.[employer]. org and sign in to your [employer] account. Enter O317 in the Intrinsity box to learn more about \"Tinea Versicolor: Care Instructions. \"     If you do not have an account, please click on the \"Sign Up Now\" link. Current as of: March 3, 2021               Content Version: 12.9  © 2006-2021 [employer]. Care instructions adapted under license by [facility] [employer] [facility] ([facility]). If you have questions about a medical condition or this instruction, always ask your healthcare professional. [place] [address] any warranty or liability for your use of this information. Patient Education        Well Visit, Ages 25 to 48: Care Instructions  Overview     Well visits can help you stay healthy. Your doctor has checked your overall health and may have suggested ways to take good care of yourself. Your doctor also may have recommended tests. At home, you can help prevent illness with healthy eating, regular exercise, and other steps. Follow-up care is a key part of your treatment and safety. Be sure to make and go to all appointments, and call your doctor if you are having problems. It's also a good idea to know your test results and keep a list of the medicines you take. How can you care for yourself at home? · Get screening tests that you and your doctor decide on. Screening helps find diseases before any symptoms appear.   · Eat healthy foods. Choose fruits, vegetables, whole grains, protein, and low-fat dairy foods. Limit fat, especially saturated fat. Reduce salt in your diet. · Limit alcohol. If you are a man, have no more than 2 drinks a day or 14 drinks a week. If you are a woman, have no more than 1 drink a day or 7 drinks a week. · Get at least 30 minutes of physical activity on most days of the week. Walking is a good choice. You also may want to do other activities, such as running, swimming, cycling, or playing tennis or team sports. Discuss any changes in your exercise program with your doctor. · Reach and stay at a healthy weight. This will lower your risk for many problems, such as obesity, diabetes, heart disease, and high blood pressure. · Do not smoke or allow others to smoke around you. If you need help quitting, talk to your doctor about stop-smoking programs and medicines. These can increase your chances of quitting for good. · Care for your mental health. It is easy to get weighed down by worry and stress. Learn strategies to manage stress, like deep breathing and mindfulness, and stay connected with your family and community. If you find you often feel sad or hopeless, talk with your doctor. Treatment can help. · Talk to your doctor about whether you have any risk factors for sexually transmitted infections (STIs). You can help prevent STIs if you wait to have sex with a new partner (or partners) until you've each been tested for STIs. It also helps if you use condoms (male or female condoms) and if you limit your sex partners to one person who only has sex with you. Vaccines are available for some STIs, such as HPV. · Use birth control if it's important to you to prevent pregnancy. Talk with your doctor about the choices available and what might be best for you. · If you think you may have a problem with alcohol or drug use, talk to your doctor.  This includes prescription medicines (such as amphetamines and opioids)

## 2021-07-26 NOTE — PROGRESS NOTES
Chief Complaint   Patient presents with    Annual Exam     Annual Physical         SUBJECTIVE:  Devon Boas is a 24 y.o. female for physical exam.    Diet - eats  A wide variety of foods,   Exercise - no formal exercise program   Sleep - sleeps well no concerns  Mood - good, no depression or anxiety concerns. Health Maintenance reviewed with patient today. HTN    Does patient check BP regularly at home? - No  Current Medication regimen - cozaar 100 mg daily and bp continues to be high will add norvasc  Tolerating medications well? - yes    Shortness of breath or chest pain? No  Headache or visual complaints? No  Neurologic changes like confusion? No  Extremity edema? No    BP Readings from Last 3 Encounters:   07/26/21 (!) 152/84   03/16/21 (!) 140/88   01/12/21 124/72       Pt states she has  Rash on her chest it is new in the last few weeks, it does itch at times off and on her sister had it a few months ago. She does sweat a lot. Past Medical History:   Diagnosis Date    Asthma     Hypertension        History reviewed. No pertinent surgical history.     Social History     Socioeconomic History    Marital status: Single     Spouse name: Not on file    Number of children: Not on file    Years of education: Not on file    Highest education level: Not on file   Occupational History    Not on file   Tobacco Use    Smoking status: Passive Smoke Exposure - Never Smoker    Smokeless tobacco: Never Used   Vaping Use    Vaping Use: Never used   Substance and Sexual Activity    Alcohol use: No    Drug use: No    Sexual activity: Never   Other Topics Concern    Not on file   Social History Narrative    Not on file     Social Determinants of Health     Financial Resource Strain: Low Risk     Difficulty of Paying Living Expenses: Not hard at all   Food Insecurity: No Food Insecurity    Worried About [address] in the Last Year: Never true    [address] in the Last Year: Never true   Transportation Needs:     Lack of Transportation (Medical):  Lack of Transportation (Non-Medical):    Physical Activity:     Days of Exercise per Week:     Minutes of Exercise per Session:    Stress:     Feeling of Stress :    Social Connections:     Frequency of Communication with Friends and Family:     Frequency of Social Gatherings with Friends and Family:     Attends Rastafarian Services:     Active Member of Clubs or Organizations:     Attends Club or Organization Meetings:     Marital Status:    Intimate Partner Violence:     Fear of Current or Ex-Partner:     Emotionally Abused:     Physically Abused:     Sexually Abused:        Family History   Problem Relation Age of Onset    High Blood Pressure Mother     High Blood Pressure Father            I have reviewed the patient's past medical history, past surgical history, allergies, medications, social and family history and I have made updates where appropriate. PHYSICAL EXAM:  BP (!) 152/84 (Site: Right Upper Arm, Position: Sitting, Cuff Size: Medium Adult)   Pulse 68   Resp 16   Ht 5' 6.5\" (1.689 m)   Wt 200 lb 12.8 oz (91.1 kg)   BMI 31.93 kg/m²       Physical Exam  Constitutional:       Appearance: She is not ill-appearing. HENT:      Right Ear: Tympanic membrane, ear canal and external ear normal.      Left Ear: Tympanic membrane, ear canal and external ear normal.      Nose: Nose normal.      Mouth/Throat:      Mouth: Mucous membranes are moist.   Cardiovascular:      Rate and Rhythm: Normal rate and regular rhythm. Pulses: Normal pulses. Heart sounds: Normal heart sounds. No murmur heard. Pulmonary:      Effort: Pulmonary effort is normal. No respiratory distress. Breath sounds: Normal breath sounds. No wheezing. Abdominal:      General: Abdomen is flat. Bowel sounds are normal. There is no distension. Palpations: Abdomen is soft. Tenderness: There is no abdominal tenderness. Skin:     General: Skin is warm and dry. Capillary Refill: Capillary refill takes less than 2 seconds. Findings: Rash (chest with widely dispersed erythemic lacy flat rash ) present. Neurological:      General: No focal deficit present. Mental Status: She is alert. Psychiatric:         Mood and Affect: Mood normal.         Behavior: Behavior normal.         Thought Content: Thought content normal.         Judgment: Judgment normal.             ASSESSMENT & PLAN  Meri was seen today for annual exam.    Diagnoses and all orders for this visit:    Physical exam  -     CBC With Auto Differential; Future  -     Basic Metabolic Panel; Future  -     TSH With Reflex Ft4; Future    Tinea versicolor  -     Salicylic Acid (SELSUN BLUE DEEP CLEANSING) 3 % SHAM; Apply 1 applicator topically daily  -     terbinafine (LAMISIL) 1 % cream; Apply topically 2 times daily. Essential hypertension  -     Basic Metabolic Panel; Future  -     losartan (COZAAR) 100 MG tablet; TAKE 1 TABLET BY MOUTH EVERY DAY  Add norvasc  Labs updated pt in agreement with plan   Screening for thyroid disorder  -     TSH With Reflex Ft4; Future    Other orders  -     fluconazole (DIFLUCAN) 100 MG tablet; Take 1 tablet by mouth daily for 7 days 3 tablet po at once and repeat in 72 hours  -     amLODIPine (NORVASC) 5 MG tablet; Take 1 tablet by mouth daily        Return in about 2 weeks (around 8/9/2021) for NV for bp check me in 1 year for phsical. .    Counseling was provided today regarding the following topics: Healthy eating habits, Regular exercise, substance abuse and healthy sleep habits.

## 2021-07-27 ENCOUNTER — HOSPITAL ENCOUNTER (OUTPATIENT)
Age: 21
Discharge: HOME | End: 2021-07-27
Payer: MEDICARE

## 2021-07-27 DIAGNOSIS — I10 ESSENTIAL HYPERTENSION: ICD-10-CM

## 2021-07-27 DIAGNOSIS — Z13.29 SCREENING FOR THYROID DISORDER: ICD-10-CM

## 2021-07-27 DIAGNOSIS — Z00.00 PHYSICAL EXAM: ICD-10-CM

## 2021-07-27 LAB
ANION GAP SERPL CALCULATED.3IONS-SCNC: 9 MEQ/L (ref 8–16)
BASOPHILS # BLD: 0.4 %
BASOPHILS ABSOLUTE: 0 THOU/MM3 (ref 0–0.1)
BUN BLDV-MCNC: 11 MG/DL (ref 7–22)
CALCIUM SERPL-MCNC: 9.6 MG/DL (ref 8.5–10.5)
CHLORIDE BLD-SCNC: 103 MEQ/L (ref 98–111)
CO2: 27 MEQ/L (ref 23–33)
CREAT SERPL-MCNC: 0.7 MG/DL (ref 0.4–1.2)
EOSINOPHIL # BLD: 0.5 %
EOSINOPHILS ABSOLUTE: 0.1 THOU/MM3 (ref 0–0.4)
ERYTHROCYTE [DISTWIDTH] IN BLOOD BY AUTOMATED COUNT: 13 % (ref 11.5–14.5)
ERYTHROCYTE [DISTWIDTH] IN BLOOD BY AUTOMATED COUNT: 39.3 FL (ref 35–45)
GFR SERPL CREATININE-BSD FRML MDRD: > 90 ML/MIN/1.73M2
GLUCOSE BLD-MCNC: 83 MG/DL (ref 70–108)
HCT VFR BLD CALC: 38.8 % (ref 37–47)
HEMOGLOBIN: 13.3 GM/DL (ref 12–16)
IMMATURE GRANS (ABS): 0.03 THOU/MM3 (ref 0–0.07)
IMMATURE GRANULOCYTES: 0.3 %
LYMPHOCYTES # BLD: 19.1 %
LYMPHOCYTES ABSOLUTE: 2.2 THOU/MM3 (ref 1–4.8)
MCH RBC QN AUTO: 28.8 PG (ref 26–33)
MCHC RBC AUTO-ENTMCNC: 34.3 GM/DL (ref 32.2–35.5)
MCV RBC AUTO: 84 FL (ref 81–99)
MONOCYTES # BLD: 7.3 %
MONOCYTES ABSOLUTE: 0.8 THOU/MM3 (ref 0.4–1.3)
NUCLEATED RED BLOOD CELLS: 0 /100 WBC
PLATELET # BLD: 232 THOU/MM3 (ref 130–400)
PMV BLD AUTO: 12.8 FL (ref 9.4–12.4)
POTASSIUM SERPL-SCNC: 3.8 MEQ/L (ref 3.5–5.2)
RBC # BLD: 4.62 MILL/MM3 (ref 4.2–5.4)
SEG NEUTROPHILS: 72.4 %
SEGMENTED NEUTROPHILS ABSOLUTE COUNT: 8.2 THOU/MM3 (ref 1.8–7.7)
SODIUM BLD-SCNC: 139 MEQ/L (ref 135–145)
TSH SERPL DL<=0.05 MIU/L-ACNC: 0.96 UIU/ML (ref 0.4–4.2)
WBC # BLD: 11.3 THOU/MM3 (ref 4.8–10.8)

## 2021-07-27 PROCEDURE — 84443 ASSAY THYROID STIM HORMONE: CPT

## 2021-07-27 PROCEDURE — 36415 COLL VENOUS BLD VENIPUNCTURE: CPT

## 2021-07-27 PROCEDURE — 80048 BASIC METABOLIC PNL TOTAL CA: CPT

## 2021-07-27 PROCEDURE — 85025 COMPLETE CBC W/AUTO DIFF WBC: CPT

## 2021-07-28 ENCOUNTER — TELEPHONE (OUTPATIENT)
Dept: FAMILY MEDICINE CLINIC | Age: 21
End: 2021-07-28

## 2021-07-28 NOTE — TELEPHONE ENCOUNTER
----- Message from ABHISHEK Peñaloza CNP sent at 7/28/2021  7:47 AM EDT -----  Let pt know all labs look good

## 2021-08-02 ENCOUNTER — TELEPHONE (OUTPATIENT)
Dept: FAMILY MEDICINE CLINIC | Age: 21
End: 2021-08-02

## 2021-08-02 DIAGNOSIS — B35.4 TINEA CORPORIS: Primary | ICD-10-CM

## 2021-08-02 RX ORDER — FLUCONAZOLE 150 MG/1
150 TABLET ORAL DAILY
Qty: 16 TABLET | Refills: 0 | Status: SHIPPED | OUTPATIENT
Start: 2021-08-02 | End: 2021-08-05

## 2021-08-02 NOTE — TELEPHONE ENCOUNTER
Sig: Take 1 tablet by mouth daily for 7 days 3 tablet po at once and repeat in 72 hours   fluconazole (DIFLUCAN) 100 MG tablet      Dispense Quantity: 6 tablet

## 2021-08-02 NOTE — TELEPHONE ENCOUNTER
----- Message from Selena Simpson sent at 7/30/2021  2:29 PM EDT -----  Subject: Message to Provider    QUESTIONS  Information for Provider? The patient is calling in regards to Diflucan   Medication that was sent to [facility] on [address]. The patient states that   pharmacy needs clarification on dosage instructions. Please call the   pharmacy and the patient.  ---------------------------------------------------------------------------  --------------  [address]  What is the best way for the office to contact you? OK to leave message on   voicemail  Preferred Call Back Phone Number? [phone]  ---------------------------------------------------------------------------  --------------  SCRIPT ANSWERS  Relationship to Patient?  Self

## 2021-08-04 ENCOUNTER — TELEPHONE (OUTPATIENT)
Dept: FAMILY MEDICINE CLINIC | Age: 21
End: 2021-08-04

## 2021-08-04 DIAGNOSIS — B35.4 TINEA CORPORIS: Primary | ICD-10-CM

## 2021-08-04 NOTE — TELEPHONE ENCOUNTER
fluconazole (DIFLUCAN) 150 MG tablet [[id number]]     Order Details  Dose: 150 mg Route: Oral Frequency: DAILY   Dispense Quantity: 16 tablet Refills: 0          Sig: Take 1 tablet by mouth daily 1 tablet po once a week for 4 weeks         Start Date: 08/02/21 End Date: 09/01/21 after 30 doses   Written Date: 08/02/21 Expiration Date: 08/02/22       Diagnosis Association: Tinea corporis (B35.4)   Original Order:  fluconazole (DIFLUCAN) 100 MG tablet [[id number]]   Providers    Authorizing Provider: ABHISHEK Solorio CNP NPI: [id number]   Ordering User:  ABHISHEK Solorio CNP        Pharmacy    [facility]/pharmacy #[id number] - [place], [place] - [address] [facility] [phone]   [address] [place] [address]   Phone:  [phone]  Fax:  [phone]     Fax from [facility] [phone] received requesting clarification on the sig above highlighted

## 2021-08-05 RX ORDER — FLUCONAZOLE 150 MG/1
TABLET ORAL
Qty: 4 TABLET | Refills: 0 | Status: SHIPPED | OUTPATIENT
Start: 2021-08-05 | End: 2021-09-15 | Stop reason: ALTCHOICE

## 2021-08-10 ENCOUNTER — NURSE ONLY (OUTPATIENT)
Dept: FAMILY MEDICINE CLINIC | Age: 21
End: 2021-08-10

## 2021-08-10 ENCOUNTER — TELEPHONE (OUTPATIENT)
Dept: FAMILY MEDICINE CLINIC | Age: 21
End: 2021-08-10

## 2021-08-10 VITALS — DIASTOLIC BLOOD PRESSURE: 84 MMHG | OXYGEN SATURATION: 100 % | HEART RATE: 90 BPM | SYSTOLIC BLOOD PRESSURE: 138 MMHG

## 2021-08-10 DIAGNOSIS — I10 ESSENTIAL HYPERTENSION: Primary | ICD-10-CM

## 2021-08-10 NOTE — PROGRESS NOTES
Pt presented to office for B/P check . Pt states she has took her morning medications for the day . Pt allowed to sit for 5 minutes .      B/P  138/84,90  Rt upper arm sitting  Large cuff      [facility]

## 2021-08-10 NOTE — TELEPHONE ENCOUNTER
Pt presented to office for B/P check . Pt states she has took her morning medications for the day . Pt allowed to sit for 5 minutes .      B/P  138/84,90  Rt upper arm sitting  Large cuff      [facility]

## 2021-09-02 ENCOUNTER — TELEPHONE (OUTPATIENT)
Dept: FAMILY MEDICINE CLINIC | Age: 21
End: 2021-09-02

## 2021-09-02 NOTE — TELEPHONE ENCOUNTER
Is she interested in another form of birth control?  Such as IUD, mirena etc? What are her thoughts for pregnancy prevention

## 2021-09-15 ENCOUNTER — OFFICE VISIT (OUTPATIENT)
Dept: FAMILY MEDICINE CLINIC | Age: 21
End: 2021-09-15
Payer: MEDICARE

## 2021-09-15 VITALS
RESPIRATION RATE: 12 BRPM | TEMPERATURE: 98 F | OXYGEN SATURATION: 100 % | HEIGHT: 65 IN | DIASTOLIC BLOOD PRESSURE: 88 MMHG | BODY MASS INDEX: 33.59 KG/M2 | HEART RATE: 87 BPM | SYSTOLIC BLOOD PRESSURE: 138 MMHG | WEIGHT: 201.6 LBS

## 2021-09-15 DIAGNOSIS — Z23 NEEDS FLU SHOT: ICD-10-CM

## 2021-09-15 DIAGNOSIS — Z02.89 ENCOUNTER FOR PHYSICAL EXAMINATION RELATED TO EMPLOYMENT: Primary | ICD-10-CM

## 2021-09-15 DIAGNOSIS — J30.89 SEASONAL ALLERGIC RHINITIS DUE TO OTHER ALLERGIC TRIGGER: ICD-10-CM

## 2021-09-15 DIAGNOSIS — I10 ESSENTIAL HYPERTENSION: ICD-10-CM

## 2021-09-15 DIAGNOSIS — R06.7 SNEEZING: ICD-10-CM

## 2021-09-15 PROCEDURE — 99395 PREV VISIT EST AGE 18-39: CPT | Performed by: NURSE PRACTITIONER

## 2021-09-15 PROCEDURE — 90674 CCIIV4 VAC NO PRSV 0.5 ML IM: CPT | Performed by: NURSE PRACTITIONER

## 2021-09-15 PROCEDURE — G8417 CALC BMI ABV UP PARAM F/U: HCPCS | Performed by: NURSE PRACTITIONER

## 2021-09-15 PROCEDURE — 1036F TOBACCO NON-USER: CPT | Performed by: NURSE PRACTITIONER

## 2021-09-15 PROCEDURE — 99213 OFFICE O/P EST LOW 20 MIN: CPT | Performed by: NURSE PRACTITIONER

## 2021-09-15 PROCEDURE — 90471 IMMUNIZATION ADMIN: CPT | Performed by: NURSE PRACTITIONER

## 2021-09-15 PROCEDURE — G8427 DOCREV CUR MEDS BY ELIG CLIN: HCPCS | Performed by: NURSE PRACTITIONER

## 2021-09-15 RX ORDER — LORATADINE 10 MG/1
10 TABLET ORAL DAILY
Qty: 90 TABLET | Refills: 3 | Status: SHIPPED | OUTPATIENT
Start: 2021-09-15 | End: 2022-09-28

## 2021-09-15 NOTE — PROGRESS NOTES
Vaccine Information Sheet, \"Influenza - Inactivated\"  given to [address], or parent/legal guardian of  [address] and verbalized understanding. Patient responses:    Have you ever had a reaction to a flu vaccine? No  Do you have an allergy to eggs, neomycin or polymixin? No  Do you have an allergy to Thimerosal, contact lens solution, or Merthiolate? No  Have you ever had [place] Syndrome? No  Do you have any current illness? No  Do you have a temperature above 100 degrees? No  Are you pregnant? No  If pregnant, permission obtained from physician? No  Do you have an active neurological disorder? No      Flu vaccine given per order. Please see immunization tab.

## 2021-09-15 NOTE — LETTER
[address] [place] [address]. [facility] [address]  Phone: [phone]  Fax: [phone]    ABHISHEK Bhagat CNP        September 15, 2021     Patient: Anastasia Felipe   YOB: 2000   Date of Visit: 9/15/2021       To Whom It May Concern: It is my medical opinion that Mg Kwok received her flu shot today, Wednesday September 15th, 2021. .    If you have any questions or concerns, please don't hesitate to call.     Sincerely,        ABHISHEK Bhagat CNP

## 2021-09-15 NOTE — PROGRESS NOTES
Chief Complaint   Patient presents with    Employment Physical     for [address] 68 Maintenance     no recent pap          SUBJECTIVE:  Denis Epperson is a 24 y.o. female for physical exam.    Diet - good diet eating normally, eats a wide variety of foods  Exercise - no formal exercise program  Sleep - sleeping well  Mood - feels happy, but tired some days    Health Maintenance reviewed with patient today. Allergies    Symptoms:  Clear nasal drainage and sneezing  Current treatment:  claritin  Triggers:  Weather changes  Pets:unknown  Smoker: non-smoker  Other smokers in the home:No          Past Medical History:   Diagnosis Date    Asthma     Hypertension        History reviewed. No pertinent surgical history. Social History     Socioeconomic History    Marital status: Single     Spouse name: Not on file    Number of children: Not on file    Years of education: Not on file    Highest education level: Not on file   Occupational History    Not on file   Tobacco Use    Smoking status: Passive Smoke Exposure - Never Smoker    Smokeless tobacco: Never Used   Vaping Use    Vaping Use: Never used   Substance and Sexual Activity    Alcohol use: No    Drug use: No    Sexual activity: Never   Other Topics Concern    Not on file   Social History Narrative    Not on file     Social Determinants of Health     Financial Resource Strain: Low Risk     Difficulty of Paying Living Expenses: Not hard at all   Food Insecurity: No Food Insecurity    Worried About [address] in the Last Year: Never true    [address] in the Last Year: Never true   Transportation Needs:     Lack of Transportation (Medical):      Lack of Transportation (Non-Medical):    Physical Activity:     Days of Exercise per Week:     Minutes of Exercise per Session:    Stress:     Feeling of Stress :    Social Connections:     Frequency of Communication with Friends and Family:     Frequency of Social Gatherings with Friends and Family:     Attends Judaism Services:     Active Member of Clubs or Organizations:     Attends Club or Organization Meetings:     Marital Status:    Intimate Partner Violence:     Fear of Current or Ex-Partner:     Emotionally Abused:     Physically Abused:     Sexually Abused:        Family History   Problem Relation Age of Onset    High Blood Pressure Mother     High Blood Pressure Father            I have reviewed the patient's past medical history, past surgical history, allergies, medications, social and family history and I have made updates where appropriate. PHYSICAL EXAM:  /88   Pulse 87   Temp 98 °F (36.7 °C) (Oral)   Resp 12   Ht 5' 5\" (1.651 m)   Wt 201 lb 9.6 oz (91.4 kg)   LMP 09/01/2021 (Approximate)   SpO2 100%   BMI 33.55 kg/m²       Physical Exam        ASSESSMENT & PLAN  Meri was seen today for employment physical and health maintenance. Diagnoses and all orders for this visit:    Encounter for physical examination related to employment    Needs flu shot    Seasonal allergic rhinitis due to other allergic trigger  -     loratadine (CLARITIN) 10 MG tablet; Take 1 tablet by mouth daily    Sneezing  -     loratadine (CLARITIN) 10 MG tablet; Take 1 tablet by mouth daily      Hypertension  Controlled with current meds  Other orders  -     INFLUENZA, MDCK QUADV, 2 YRS AND OLDER, IM, PF, PREFILL SYR OR SDV, 0.5ML (FLUCELVAX QUADV, PF)  Pt cleared for employment   Chronic conditions stable  Continue meds as ordered     Return if symptoms worsen or fail to improve. Counseling was provided today regarding the following topics: Healthy eating habits, Regular exercise, substance abuse and healthy sleep habits.

## 2021-11-18 ENCOUNTER — TELEPHONE (OUTPATIENT)
Dept: FAMILY MEDICINE CLINIC | Age: 21
End: 2021-11-18

## 2021-11-18 ENCOUNTER — NURSE ONLY (OUTPATIENT)
Dept: FAMILY MEDICINE CLINIC | Age: 21
End: 2021-11-18
Payer: MEDICARE

## 2021-11-18 DIAGNOSIS — U07.1 LAB TEST POSITIVE FOR DETECTION OF COVID-19 VIRUS: Primary | ICD-10-CM

## 2021-11-18 DIAGNOSIS — R09.89 CHEST CONGESTION: ICD-10-CM

## 2021-11-18 DIAGNOSIS — R09.81 NASAL CONGESTION: Primary | ICD-10-CM

## 2021-11-18 LAB
Lab: ABNORMAL
PERFORMING INSTRUMENT: ABNORMAL
QC PASS/FAIL: ABNORMAL
SARS-COV-2, POC: DETECTED

## 2021-11-18 PROCEDURE — 87426 SARSCOV CORONAVIRUS AG IA: CPT | Performed by: FAMILY MEDICINE

## 2021-11-18 NOTE — PROGRESS NOTES
Pt tested for COVID-19 via antigen testing. Pt antigen test is positive. Pt given instructions and was told to report to PCP regarding results. If sxs worsen, needs to be evaluated at ED.

## 2021-11-18 NOTE — TELEPHONE ENCOUNTER
Agree  Isolate at home for 10 days from onset of sxs  Call any changes  Let me know if questions, thanks!

## 2021-11-18 NOTE — TELEPHONE ENCOUNTER
----- Message from Trav Diehl DO sent at 11/18/2021  1:55 PM EST -----  Please f/u pt and see how doing and if any questions  Let me know, thanks!

## 2021-11-18 NOTE — TELEPHONE ENCOUNTER
----- Message from Alexia Lerner sent at 11/18/2021  9:55 AM EST -----  Subject: Message to Provider    QUESTIONS  Information for Provider? Patient would like for Dr Khai Puri to order in   Covid Test stated she took 2 Rapid test from her job Thursday 11/18/21 and   they came back positive but her job want her to get other test done. Symptoms started this week with stuffy nose and sore throat.   ---------------------------------------------------------------------------  --------------  CALL BACK INFO  What is the best way for the office to contact you? OK to leave message on   voicemail  Preferred Call Back Phone Number? [phone]  ---------------------------------------------------------------------------  --------------  SCRIPT ANSWERS  Relationship to Patient?  Self

## 2021-11-29 ENCOUNTER — NURSE ONLY (OUTPATIENT)
Dept: FAMILY MEDICINE CLINIC | Age: 21
End: 2021-11-29

## 2021-11-29 DIAGNOSIS — U09.9 POST COVID-19 CONDITION, UNSPECIFIED: Primary | ICD-10-CM

## 2021-12-01 ENCOUNTER — TELEPHONE (OUTPATIENT)
Dept: FAMILY MEDICINE CLINIC | Age: 21
End: 2021-12-01

## 2021-12-01 LAB
SARS-COV-2: NOT DETECTED
SOURCE: NORMAL

## 2021-12-01 NOTE — TELEPHONE ENCOUNTER
Pt informed and verbalized understanding.   The new covid test on 11/29/21 is negative  Pt states she is doing ok

## 2021-12-01 NOTE — TELEPHONE ENCOUNTER
It was positive, quarantine 10 days from symptom onset. Pt came through drive through testing and should have also been given info on vitamins to take. Ask how she is feeling.

## 2022-01-20 RX ORDER — AMLODIPINE BESYLATE 5 MG/1
TABLET ORAL
Qty: 90 TABLET | Refills: 1 | Status: SHIPPED | OUTPATIENT
Start: 2022-01-20 | End: 2022-07-19

## 2022-01-20 NOTE — TELEPHONE ENCOUNTER
Last visit- 9/15/2021  Next visit- 7/27/22    Requested Prescriptions     Pending Prescriptions Disp Refills    amLODIPine (NORVASC) 5 MG tablet [Pharmacy Med Name: AMLODIPINE BESYLATE 5 MG TAB] 90 tablet 1     Sig: TAKE 1 TABLET BY MOUTH EVERY DAY        Last fill date- 7/26/21  Pharmacy-  [facility]/pharmacy #[id number]- [place], [address] [phone]   [address] [place] [address]   Phone:  [phone]  Fax:  [phone]

## 2022-03-10 ENCOUNTER — HOSPITAL ENCOUNTER (OUTPATIENT)
Age: 22
Discharge: HOME | End: 2022-03-10
Payer: MEDICARE

## 2022-03-10 ENCOUNTER — OFFICE VISIT (OUTPATIENT)
Dept: FAMILY MEDICINE CLINIC | Age: 22
End: 2022-03-10
Payer: MEDICARE

## 2022-03-10 VITALS
OXYGEN SATURATION: 99 % | SYSTOLIC BLOOD PRESSURE: 138 MMHG | BODY MASS INDEX: 32.02 KG/M2 | DIASTOLIC BLOOD PRESSURE: 78 MMHG | HEART RATE: 101 BPM | WEIGHT: 192.2 LBS | RESPIRATION RATE: 14 BRPM | HEIGHT: 65 IN | TEMPERATURE: 99.1 F

## 2022-03-10 DIAGNOSIS — Z11.3 ROUTINE SCREENING FOR STI (SEXUALLY TRANSMITTED INFECTION): ICD-10-CM

## 2022-03-10 DIAGNOSIS — Z01.419 PAP SMEAR, AS PART OF ROUTINE GYNECOLOGICAL EXAMINATION: Primary | ICD-10-CM

## 2022-03-10 DIAGNOSIS — Z11.51 SCREENING FOR HUMAN PAPILLOMAVIRUS (HPV): ICD-10-CM

## 2022-03-10 DIAGNOSIS — Z13.31 DEPRESSION SCREEN: ICD-10-CM

## 2022-03-10 DIAGNOSIS — B37.31 VAGINAL CANDIDA: ICD-10-CM

## 2022-03-10 LAB — TRICHOMONAS VAGINALIS SCREEN: NEGATIVE

## 2022-03-10 PROCEDURE — 86803 HEPATITIS C AB TEST: CPT

## 2022-03-10 PROCEDURE — 87808 TRICHOMONAS ASSAY W/OPTIC: CPT | Performed by: NURSE PRACTITIONER

## 2022-03-10 PROCEDURE — 87389 HIV-1 AG W/HIV-1&-2 AB AG IA: CPT

## 2022-03-10 PROCEDURE — G8482 FLU IMMUNIZE ORDER/ADMIN: HCPCS | Performed by: NURSE PRACTITIONER

## 2022-03-10 PROCEDURE — 36415 COLL VENOUS BLD VENIPUNCTURE: CPT

## 2022-03-10 PROCEDURE — 99395 PREV VISIT EST AGE 18-39: CPT | Performed by: NURSE PRACTITIONER

## 2022-03-10 RX ORDER — FLUCONAZOLE 150 MG/1
150 TABLET ORAL
Qty: 2 TABLET | Refills: 0 | Status: SHIPPED | OUTPATIENT
Start: 2022-03-10 | End: 2022-03-16

## 2022-03-10 ASSESSMENT — PATIENT HEALTH QUESTIONNAIRE - PHQ9
SUM OF ALL RESPONSES TO PHQ QUESTIONS 1-9: 0
SUM OF ALL RESPONSES TO PHQ QUESTIONS 1-9: 0
SUM OF ALL RESPONSES TO PHQ9 QUESTIONS 1 & 2: 0
1. LITTLE INTEREST OR PLEASURE IN DOING THINGS: 0
SUM OF ALL RESPONSES TO PHQ QUESTIONS 1-9: 0
SUM OF ALL RESPONSES TO PHQ QUESTIONS 1-9: 0
2. FEELING DOWN, DEPRESSED OR HOPELESS: 0

## 2022-03-10 NOTE — PATIENT INSTRUCTIONS
Patient Education        Learning About Cervical Cancer Screening  What is a cervical cancer screening test?     The cervix is the lower part of the uterus that opens into the vagina. Cervical cancer screening tests check the cells on the cervix for changes that could lead to cancer. Two tests can be used to screen for cervical cancer. They may be used alone or together. A Pap test.  This test looks for changes in the cells of the cervix. Some of these cell changes could lead to cancer. A human papillomavirus (HPV) test.  This test looks for the HPV virus. Some high-risk types of HPV can cause cell changes that could lead to cervical cancer. When should you have a screening test?  If you have a cervix, you may need cervical cancer screening. Screening will depend on many things. Ages 24 to 34  Screening options for these ages include:  · A Pap test. If your results are normal, you can wait 3 years to have another test.  · An HPV test beginning at age 22. If your results are negative, you can wait 5 years to have another test.  Ages 27 to 59  Screening options for these ages include:  · A Pap test. If your results are normal, you can wait 3 years to have another test.  · An HPV test. If your results are negative, you can wait 5 years to have another test.  · A Pap test and an HPV test. If your results are normal, you can wait 5 years to be tested again. Ages 72 and older  If you are age 72 or older and you've always had normal screening results, you may not need screening. Talk to your doctor. What do the results of cervical cancer screening mean? Your test results may be normal. Or the results may show minor or serious changes to the cells on your cervix. Minor changes may go away on their own, especially if you are younger than 30.   You may have an abnormal test because you have an infection of the vagina or cervix or because you have low estrogen levels after menopause that are causing the cells to change. If you have a high-risk type of human papillomavirus (HPV) or cell changes that could turn into cancer, you may need more tests. Your doctor may suggest that you wait to be retested. Or you may need to have a colposcopy or treatment right away. Your doctor will recommend a follow-up plan based on your results and your age. Follow-up care is a key part of your treatment and safety. Be sure to make and go to all appointments, and call your doctor if you are having problems. It's also a good idea to know your test results and keep a list of the medicines you take. Where can you learn more? Go to https://Stickypepiceweb.XMPie. org and sign in to your [employer] account. Enter P919 in the MDLIVE box to learn more about \"Learning About Cervical Cancer Screening. \"     If you do not have an account, please click on the \"Sign Up Now\" link. Current as of: September 8, 2021               Content Version: 13.1  © 2006-2021 [employer]. Care instructions adapted under license by [facility] ([facility]). If you have questions about a medical condition or this instruction, always ask your healthcare professional. [place] [address] any warranty or liability for your use of this information. Patient Education        Well Visit, Ages 25 to 48: Care Instructions  Overview     Well visits can help you stay healthy. Your doctor has checked your overall health and may have suggested ways to take good care of yourself. Your doctor also may have recommended tests. At home, you can help prevent illness with healthy eating, regular exercise, and other steps. Follow-up care is a key part of your treatment and safety. Be sure to make and go to all appointments, and call your doctor if you are having problems. It's also a good idea to know your test results and keep a list of the medicines you take. How can you care for yourself at home?   · Get screening tests that you and your doctor decide on. Screening helps find diseases before any symptoms appear. · Eat healthy foods. Choose fruits, vegetables, whole grains, protein, and low-fat dairy foods. Limit fat, especially saturated fat. Reduce salt in your diet. · Limit alcohol. If you are a man, have no more than 2 drinks a day or 14 drinks a week. If you are a woman, have no more than 1 drink a day or 7 drinks a week. · Get at least 30 minutes of physical activity on most days of the week. Walking is a good choice. You also may want to do other activities, such as running, swimming, cycling, or playing tennis or team sports. Discuss any changes in your exercise program with your doctor. · Reach and stay at a healthy weight. This will lower your risk for many problems, such as obesity, diabetes, heart disease, and high blood pressure. · Do not smoke or allow others to smoke around you. If you need help quitting, talk to your doctor about stop-smoking programs and medicines. These can increase your chances of quitting for good. · Care for your mental health. It is easy to get weighed down by worry and stress. Learn strategies to manage stress, like deep breathing and mindfulness, and stay connected with your family and community. If you find you often feel sad or hopeless, talk with your doctor. Treatment can help. · Talk to your doctor about whether you have any risk factors for sexually transmitted infections (STIs). You can help prevent STIs if you wait to have sex with a new partner (or partners) until you've each been tested for STIs. It also helps if you use condoms (male or female condoms) and if you limit your sex partners to one person who only has sex with you. Vaccines are available for some STIs, such as HPV. · Use birth control if it's important to you to prevent pregnancy. Talk with your doctor about the choices available and what might be best for you.   · If you think you may have a problem with alcohol or drug use, talk to your doctor. This includes prescription medicines (such as amphetamines and opioids) and illegal drugs (such as cocaine and methamphetamine). Your doctor can help you figure out what type of treatment is best for you. · Protect your skin from too much sun. When you're outdoors from 10 a.m. to 4 p.m., stay in the shade or cover up with clothing and a hat with a wide brim. Wear sunglasses that block UV rays. Even when it's cloudy, put broad-spectrum sunscreen (SPF 30 or higher) on any exposed skin. · See a dentist one or two times a year for checkups and to have your teeth cleaned. · Wear a seat belt in the car. When should you call for help? Watch closely for changes in your health, and be sure to contact your doctor if you have any problems or symptoms that concern you. Where can you learn more? Go to https://SnapfishpeDong Energyeb.Alien Technology. org and sign in to your [employer] account. Enter P072 in the DoCircuits box to learn more about \"Well Visit, Ages 25 to 48: Care Instructions. \"     If you do not have an account, please click on the \"Sign Up Now\" link. Current as of: October 6, 2021               Content Version: 13.1  © 2006-2021 [employer]wise[employer] Incorporated. Care instructions adapted under license by [facility] ([facility]). If you have questions about a medical condition or this instruction, always ask your healthcare professional. [place] [address] any warranty or liability for your use of this information.

## 2022-03-10 NOTE — PROGRESS NOTES
Joao De Dios is a 25 y.o. female for   Chief Complaint   Patient presents with    Annual Exam    Gynecologic Exam    Medication Refill       HPI:    No LMP recorded. Menses occurs every 30 days , lasting 5 days, flow is moderate, there is mild dysmenorrhea. Patient Gynecological History No Yes Not Asked comment          History of Abnormal Pap [x]                     []                     []                  Last pap: first one   Has received HPV vaccine [x]                     []                     []                     Currently sexually active [x]                     []                     []                  condoms   History of Sexually Transmitted Disease [x]            []                     []                     History of Abnormal Mammograms [x]            []                     []                   Last mammo: n/a     Last Dexa Scan: n/a  Last Colonoscopy: n/a    Urinary Incontinence? No  Dysuria? No  Vaginal itching or Dryness? Yes - she has bene experiencing some vaginal irritation with thick white discharge   Vaginal discharge?   Yes    Social History     Socioeconomic History    Marital status: Single     Spouse name: Not on file    Number of children: Not on file    Years of education: Not on file    Highest education level: Not on file   Occupational History    Not on file   Tobacco Use    Smoking status: Passive Smoke Exposure - Never Smoker    Smokeless tobacco: Never Used   Vaping Use    Vaping Use: Never used   Substance and Sexual Activity    Alcohol use: No    Drug use: No    Sexual activity: Never   Other Topics Concern    Not on file   Social History Narrative    Not on file     Social Determinants of Health     Financial Resource Strain: Low Risk     Difficulty of Paying Living Expenses: Not hard at all   Food Insecurity: No Food Insecurity    Worried About [address] in the Last Year: Never true    [facility] in the Last Year: Never true Transportation Needs:     Lack of Transportation (Medical): Not on file    Lack of Transportation (Non-Medical): Not on file   Physical Activity:     Days of Exercise per Week: Not on file    Minutes of Exercise per Session: Not on file   Stress:     Feeling of Stress : Not on file   Social Connections:     Frequency of Communication with Friends and Family: Not on file    Frequency of Social Gatherings with Friends and Family: Not on file    Attends Worship Services: Not on file    Active Member of [address] [place] [address] [employer] or Organizations: Not on file    Attends Club or Organization Meetings: Not on file    Marital Status: Not on file   Intimate Partner Violence:     Fear of Current or Ex-Partner: Not on file    Emotionally Abused: Not on file    Physically Abused: Not on file    Sexually Abused: Not on file   Housing Stability:     Unable to Pay for Housing in the Last Year: Not on file    Number of [place] in the Last Year: Not on file    Unstable Housing in the Last Year: Not on file     Social History     Substance and Sexual Activity   Sexual Activity Never       OBJECTIVE:  /78   Pulse 101   Temp 99.1 °F (37.3 °C) (Oral)   Resp 14   Ht 5' 5\" (1.651 m)   Wt 192 lb 3.2 oz (87.2 kg)   SpO2 99%   BMI 31.98 kg/m²   Physical Examination: General appearance - alert, well appearing, and in no distress  Chest - clear to auscultation, no wheezes, rales or rhonchi, symmetric air entry  Heart - normal rate, regular rhythm, normal S1, S2, no murmurs, rubs, clicks or gallops  Abdomen - soft, nontender, nondistended, no masses or organomegaly  Extremities - peripheral pulses normal, no pedal edema, no clubbing or cyanosis  Psych:  Affect appropriate. Thought process is normal without evidence of depression or psychosis. Good insight and appropriae interaction. Cognition and memory appear to be intact.   Breasts - breasts appear normal, no suspicious masses, no skin or nipple changes or axillary following healthy behaviors: medication adherence  Reviewed prior labs and health maintenance. Continue current medications, diet and exercise. Discussed use, benefit, and side effects of prescribed medications. Barriers to medication compliance addressed. Patient given educational materials - see patient instructions. All patient questions answered. Patient voiced understanding.

## 2022-03-11 LAB
HEPATITIS C ANTIBODY: NEGATIVE
HIV AG/AB: NONREACTIVE

## 2022-03-15 ENCOUNTER — TELEPHONE (OUTPATIENT)
Dept: FAMILY MEDICINE CLINIC | Age: 22
End: 2022-03-15

## 2022-03-15 NOTE — TELEPHONE ENCOUNTER
----- Message from ABHISHEK Jones CNP sent at 3/14/2022  5:23 PM EDT -----  Let pt know HIV test is negative
mychart msg sent.
no

## 2022-03-16 DIAGNOSIS — Z30.41 SURVEILLANCE FOR BIRTH CONTROL, ORAL CONTRACEPTIVES: Primary | ICD-10-CM

## 2022-03-16 RX ORDER — NORGESTIMATE AND ETHINYL ESTRADIOL 0.25-0.035
1 KIT ORAL DAILY
Qty: 3 PACKET | Refills: 3 | Status: SHIPPED | OUTPATIENT
Start: 2022-03-16 | End: 2022-08-16 | Stop reason: SDUPTHER

## 2022-03-17 LAB — CYTOLOGY THIN PREP PAP: NORMAL

## 2022-03-18 ENCOUNTER — HOSPITAL ENCOUNTER (EMERGENCY)
Age: 22
Discharge: HOME | End: 2022-03-18
Payer: MEDICARE

## 2022-03-18 ENCOUNTER — TELEPHONE (OUTPATIENT)
Dept: FAMILY MEDICINE CLINIC | Age: 22
End: 2022-03-18

## 2022-03-18 VITALS
OXYGEN SATURATION: 99 % | WEIGHT: 192 LBS | RESPIRATION RATE: 16 BRPM | DIASTOLIC BLOOD PRESSURE: 99 MMHG | TEMPERATURE: 98.5 F | SYSTOLIC BLOOD PRESSURE: 164 MMHG | BODY MASS INDEX: 31.95 KG/M2 | HEART RATE: 103 BPM

## 2022-03-18 DIAGNOSIS — Z11.3 SCREENING EXAMINATION FOR STD (SEXUALLY TRANSMITTED DISEASE): Primary | ICD-10-CM

## 2022-03-18 LAB
BILIRUBIN URINE: NEGATIVE
BLOOD, URINE: NEGATIVE
CHARACTER, URINE: CLEAR
CHLAMYDIA TRACHOMATIS BY RT-PCR: NOT DETECTED
COLOR: YELLOW
CT/NG SOURCE: NORMAL
GLUCOSE URINE: NEGATIVE MG/DL
KETONES, URINE: NEGATIVE
LEUKOCYTE ESTERASE, URINE: ABNORMAL
NEISSERIA GONORRHOEAE BY RT-PCR: NOT DETECTED
NITRITE, URINE: NEGATIVE
PH, URINE: 6 (ref 5–9)
PREGNANCY, URINE: NEGATIVE
PROTEIN, URINE: NEGATIVE MG/DL
SPECIFIC GRAVITY, URINE: 1.02 (ref 1–1.03)
TRICHOMONAS PREP: NEGATIVE
UROBILINOGEN, URINE: 0.2 EU/DL (ref 0.2–1)

## 2022-03-18 PROCEDURE — 87205 SMEAR GRAM STAIN: CPT

## 2022-03-18 PROCEDURE — 87808 TRICHOMONAS ASSAY W/OPTIC: CPT

## 2022-03-18 PROCEDURE — 81025 URINE PREGNANCY TEST: CPT

## 2022-03-18 PROCEDURE — 99213 OFFICE O/P EST LOW 20 MIN: CPT | Performed by: NURSE PRACTITIONER

## 2022-03-18 PROCEDURE — 81003 URINALYSIS AUTO W/O SCOPE: CPT

## 2022-03-18 PROCEDURE — 87491 CHLMYD TRACH DNA AMP PROBE: CPT

## 2022-03-18 PROCEDURE — 87070 CULTURE OTHR SPECIMN AEROBIC: CPT

## 2022-03-18 PROCEDURE — 99213 OFFICE O/P EST LOW 20 MIN: CPT

## 2022-03-18 PROCEDURE — 87086 URINE CULTURE/COLONY COUNT: CPT

## 2022-03-18 PROCEDURE — 87591 N.GONORRHOEAE DNA AMP PROB: CPT

## 2022-03-18 ASSESSMENT — ENCOUNTER SYMPTOMS
VOMITING: 0
ABDOMINAL PAIN: 0
NAUSEA: 0
EYE ITCHING: 0
DIARRHEA: 0
SHORTNESS OF BREATH: 0
COUGH: 0
EYE REDNESS: 0

## 2022-03-18 NOTE — ED TRIAGE NOTES
Pt to room 3 and c/o some intermittent pelvic pain in the last 2-3 days along with some vaginal discharge that is \"different\"than normal. She would like to be tested for STDs.

## 2022-03-18 NOTE — TELEPHONE ENCOUNTER
----- Message from Ree Hernandes,  sent at 3/18/2022  7:10 AM EDT -----  Please update pt that PAP is negative  Repeat 3 years  Let me know if questions, thanks!

## 2022-03-18 NOTE — ED PROVIDER NOTES
[address]       Chief Complaint   Patient presents with    Pelvic Pain     \"off and on for the last 3 days\"       Nurses Notes reviewed and I agree except as noted in the HPI. HISTORY OF PRESENT ILLNESS   Marcell Curry is a 25 y.o. female who presents for evaluation. She is requesting STD screening due to vaginal discharge that just started this week. Pap smear and STD screening 3/10/22 by her PCP. She was told that everything was negative except that she had a yeast infection and she was placed on Diflucan. She has since finished treatment for that. The patient/patient representative has no other acute complaints at this time. REVIEW OF SYSTEMS     Review of Systems   Constitutional: Negative for chills, fatigue and fever. Eyes: Negative for redness and itching. Respiratory: Negative for cough and shortness of breath. Cardiovascular: Negative for chest pain. Gastrointestinal: Negative for abdominal pain, diarrhea, nausea and vomiting. Genitourinary: Positive for vaginal discharge. Negative for dysuria, flank pain, frequency, genital sores, hematuria and urgency. Skin: Negative for rash. Allergic/Immunologic: Negative for environmental allergies and food allergies. Neurological: Negative for headaches. PAST MEDICAL HISTORY         Diagnosis Date    Asthma     Hypertension        SURGICAL HISTORY     Patient  has no past surgical history on file.     CURRENT MEDICATIONS       Previous Medications    AMLODIPINE (NORVASC) 5 MG TABLET    TAKE 1 TABLET BY MOUTH EVERY DAY    IBUPROFEN (IBU) 400 MG TABLET    Take 1 tablet by mouth every 6 hours as needed for Pain    LORATADINE (CLARITIN) 10 MG TABLET    Take 1 tablet by mouth daily    LOSARTAN (COZAAR) 100 MG TABLET    TAKE 1 TABLET BY MOUTH EVERY DAY    MULTIPLE VITAMIN (MULTI VITAMIN DAILY PO)    Take by mouth    NORGESTIMATE-ETHINYL ESTRADIOL (ORTHO-CYCLEN, 28,) 0.25-35 MG-MCG PER TABLET    Take 1 tablet by mouth daily    SALICYLIC ACID (SELSUN BLUE DEEP CLEANSING) 3 % SHAM    Apply 1 applicator topically daily    TERBINAFINE (LAMISIL) 1 % CREAM    Apply topically 2 times daily. ALLERGIES     Patient is has No Known Allergies. FAMILY HISTORY     Patient'sfamily history includes High Blood Pressure in her father and mother. SOCIAL HISTORY     Patient  reports that she has never smoked. She has never used smokeless tobacco. She reports that she does not drink alcohol and does not use drugs. PHYSICAL EXAM     ED TRIAGE VITALS  BP: (!) 164/99, Temp: 98.5 °F (36.9 °C), Pulse: 103, Resp: 16, SpO2: 99 %  Physical Exam  Vitals and nursing note reviewed. Constitutional:       General: She is not in acute distress. Appearance: Normal appearance. She is well-developed and well-groomed. HENT:      Head: Normocephalic and atraumatic. Right Ear: External ear normal.      Left Ear: External ear normal.      Mouth/Throat:      Lips: Pink. Mouth: Mucous membranes are moist.   Eyes:      Conjunctiva/sclera: Conjunctivae normal.      Right eye: Right conjunctiva is not injected. Left eye: Left conjunctiva is not injected. Pupils: Pupils are equal.   Cardiovascular:      Rate and Rhythm: Normal rate. Pulmonary:      Effort: Pulmonary effort is normal. No respiratory distress. Genitourinary:     Comments: Self swab  Musculoskeletal:      Cervical back: Normal range of motion. Skin:     General: Skin is warm and dry. Findings: No rash (on exposed surfaces). Neurological:      Mental Status: She is alert and oriented to person, place, and time. Psychiatric:         Mood and Affect: Mood normal.         Speech: Speech normal.         Behavior: Behavior is cooperative.          DIAGNOSTIC RESULTS   Labs:  Abnormal Labs Reviewed   URINALYSIS - Abnormal; Notable for the following components:       Result Value    Leukocyte Esterase, Urine Small (*)     All other components within normal limits        IMAGING:  No orders to display     URGENT CARE COURSE:     Vitals:    03/18/22 0845 03/18/22 0849   BP:  (!) 164/99   Pulse: 103    Resp: 16    Temp: 98.5 °F (36.9 °C)    TempSrc: Oral    SpO2: 99%    Weight: 192 lb (87.1 kg)        Medications - No data to display  PROCEDURES:  FINALIMPRESSION      1. Screening examination for STD (sexually transmitted disease)        DISPOSITION/PLAN   DISPOSITION    Discharge   Patient declines treatment today while in the urgent care and wishes to wait for test results. ED Course as of 03/18/22 0948   Fri Mar 18, 2022   0941 Color, UA: Yellow [HA]   0942 Leukocyte Esterase, Urine(!): Small [HA]   0942 Pregnancy, Urine: NEGATIVE [HA]   0947 Trichomonas Prep: NEGATIVE [HA]   0947 C. Trachomatis / [facility] [place], [address] Culture, Genital [HA]      ED Course User Index  [facility] A ABHISHEK Major - CNP       Problem List Items Addressed This Visit     None      Visit Diagnoses     Screening examination for STD (sexually transmitted disease)    -  Primary          Physical assessment findings, diagnostic testing(s) if applicable, and vital signs reviewed with patient/patient representative. Differential diagnosis(s) discussed with patient/patient representative. Prescription medications and/or over-the-counter medications for symptom management discussed. Patient is to follow-up with family care provider in 2-3 days if no improvement. If symptoms should worsen or change, go to the ED. Patient/patient representative is aware of care plan, questions answered, verbalizes understanding and is in agreement. Printed instructions attached to after visit summary. If COVID-19 positive or COVID-19 by PCR is pending at time of discharge patient is to quarantine/isolate according to [facility] guidelines.       PATIENT REFERRED TO:  [facility] Department  [address] [facility] [address] [id number]    Schedule an appointment as soon as possible for a visit   for further STD screening    ABHISHEK Hernandez - CNP  [address]  [phone]    Schedule an appointment as soon as possible for a visit in 3 days  For further evaluation. , If symptoms change/worsen, go to the [id number] [facility], APRN - CNP    Please note that some or all of this chart was generated using [employer] voice recognition software. Although every effort was made to ensure the accuracy of this automated transcription, some errors in transcription may have occurred.         ABHISHEK Esteves CNP  03/18/22 5937

## 2022-03-20 LAB
ORGANISM: ABNORMAL
URINE CULTURE, ROUTINE: ABNORMAL

## 2022-03-21 LAB
GENITAL CULTURE, ROUTINE: NORMAL
GRAM STAIN RESULT: NORMAL

## 2022-05-01 ENCOUNTER — HOSPITAL ENCOUNTER (EMERGENCY)
Age: 22
Discharge: HOME | End: 2022-05-01
Payer: MEDICARE

## 2022-05-01 VITALS
HEIGHT: 66 IN | WEIGHT: 203 LBS | TEMPERATURE: 98 F | DIASTOLIC BLOOD PRESSURE: 83 MMHG | HEART RATE: 89 BPM | BODY MASS INDEX: 32.62 KG/M2 | SYSTOLIC BLOOD PRESSURE: 137 MMHG | OXYGEN SATURATION: 97 % | RESPIRATION RATE: 18 BRPM

## 2022-05-01 DIAGNOSIS — J03.80 ACUTE VIRAL TONSILLITIS: Primary | ICD-10-CM

## 2022-05-01 DIAGNOSIS — B97.89 ACUTE VIRAL TONSILLITIS: Primary | ICD-10-CM

## 2022-05-01 LAB
GROUP A STREP CULTURE, REFLEX: NEGATIVE
REFLEX THROAT C + S: NORMAL

## 2022-05-01 PROCEDURE — 99213 OFFICE O/P EST LOW 20 MIN: CPT

## 2022-05-01 PROCEDURE — 87880 STREP A ASSAY W/OPTIC: CPT

## 2022-05-01 PROCEDURE — 99213 OFFICE O/P EST LOW 20 MIN: CPT | Performed by: NURSE PRACTITIONER

## 2022-05-01 PROCEDURE — 87070 CULTURE OTHR SPECIMN AEROBIC: CPT

## 2022-05-01 ASSESSMENT — ENCOUNTER SYMPTOMS
TROUBLE SWALLOWING: 0
DIARRHEA: 0
EYE REDNESS: 0
NAUSEA: 0
RHINORRHEA: 0
SHORTNESS OF BREATH: 0
SORE THROAT: 1
VOMITING: 0
EYE DISCHARGE: 0
COUGH: 0

## 2022-05-01 ASSESSMENT — PAIN SCALES - GENERAL: PAINLEVEL_OUTOF10: 3

## 2022-05-01 ASSESSMENT — PAIN DESCRIPTION - LOCATION: LOCATION: THROAT

## 2022-05-01 ASSESSMENT — PAIN - FUNCTIONAL ASSESSMENT
PAIN_FUNCTIONAL_ASSESSMENT: ACTIVITIES ARE NOT PREVENTED
PAIN_FUNCTIONAL_ASSESSMENT: 0-10

## 2022-05-01 ASSESSMENT — PAIN DESCRIPTION - FREQUENCY: FREQUENCY: CONTINUOUS

## 2022-05-01 ASSESSMENT — PAIN DESCRIPTION - DESCRIPTORS: DESCRIPTORS: ACHING

## 2022-05-01 ASSESSMENT — PAIN DESCRIPTION - PAIN TYPE: TYPE: ACUTE PAIN

## 2022-05-01 NOTE — ED PROVIDER NOTES
[address] [facility] Encounter      [address] [facility]       Chief Complaint   Patient presents with    Pharyngitis     onset yesterday        Nurses Notes reviewed and I agree except as noted in the HPI. HISTORY OF PRESENT ILLNESS   Abraham Zavala is a 25 y.o. female who presents for evaluation of sore throat. Onset yesterday, unchanged. Sore throat is aching, continuous. Rates 2/10. No fever, trouble swallowing. No strep, COVID, flu exposure. No treatment prior to arrival.    REVIEW OF SYSTEMS     Review of Systems   Constitutional: Negative for chills, diaphoresis, fatigue and fever. HENT: Positive for sore throat. Negative for congestion, ear pain, rhinorrhea and trouble swallowing. Eyes: Negative for discharge and redness. Respiratory: Negative for cough and shortness of breath. Cardiovascular: Negative for chest pain. Gastrointestinal: Negative for diarrhea, nausea and vomiting. Genitourinary: Negative for decreased urine volume. Musculoskeletal: Negative for neck pain and neck stiffness. Skin: Negative for rash. Neurological: Negative for headaches. Hematological: Negative for adenopathy. Psychiatric/Behavioral: Negative for sleep disturbance. PAST MEDICAL HISTORY         Diagnosis Date    Asthma     Hypertension        SURGICAL HISTORY     Patient  has no past surgical history on file.     CURRENT MEDICATIONS       Discharge Medication List as of 5/1/2022  1:44 PM      CONTINUE these medications which have NOT CHANGED    Details   Multiple Vitamin (MULTI VITAMIN DAILY PO) Take by mouthHistorical Med      norgestimate-ethinyl estradiol (ORTHO-CYCLEN, 28,) 0.25-35 MG-MCG per tablet Take 1 tablet by mouth daily, Disp-3 packet, R-3Normal      amLODIPine (NORVASC) 5 MG tablet TAKE 1 TABLET BY MOUTH EVERY DAY, Disp-90 tablet, R-1Normal      loratadine (CLARITIN) 10 MG tablet Take 1 tablet by mouth daily, Disp-90 tablet, [id number]      Salicylic Acid (SELSUN BLUE DEEP CLEANSING) 3 % SHAM Apply 1 applicator topically daily, Disp-236 mL, R-3Normal      terbinafine (LAMISIL) 1 % cream Apply topically 2 times daily. , Disp-42 g, R-2, Normal      losartan (COZAAR) 100 MG tablet TAKE 1 TABLET BY MOUTH EVERY DAY, Disp-90 tablet, R-4Normal      ibuprofen (IBU) 400 MG tablet Take 1 tablet by mouth every 6 hours as needed for Pain, Disp-30 tablet, R-0Normal             ALLERGIES     Patient is has No Known Allergies. FAMILY HISTORY     Patient'sfamily history includes High Blood Pressure in her father and mother. SOCIAL HISTORY     Patient  reports that she has never smoked. She has never used smokeless tobacco. She reports that she does not drink alcohol and does not use drugs. PHYSICAL EXAM     ED TRIAGE VITALS  BP: 137/83, Temp: 98 °F (36.7 °C), Pulse: 89, Resp: 18, SpO2: 97 %  Physical Exam  Vitals and nursing note reviewed. Constitutional:       General: She is not in acute distress. Appearance: Normal appearance. She is well-developed. She is not ill-appearing, toxic-appearing or diaphoretic. HENT:      Head: Normocephalic and atraumatic. Right Ear: Hearing, tympanic membrane, ear canal and external ear normal. No mastoid tenderness. No hemotympanum. Tympanic membrane is not perforated, erythematous or bulging. Left Ear: Hearing, tympanic membrane, ear canal and external ear normal. No mastoid tenderness. No hemotympanum. Tympanic membrane is not perforated, erythematous or bulging. Nose: Nose normal. No congestion. Mouth/Throat:      Mouth: Mucous membranes are moist.      Pharynx: Oropharynx is clear. Uvula midline. Posterior oropharyngeal erythema present. Tonsils: Tonsillar exudate present. No tonsillar abscesses. 1+ on the right. 1+ on the left. Comments: Bilateral tonsillar erythema/exudate, 1+. No abscess. Eyes:      General: No scleral icterus.      Conjunctiva/sclera: Conjunctivae normal.      Right eye: Right conjunctiva is not injected. No hemorrhage. Left eye: Left conjunctiva is not injected. No hemorrhage. Neck:      Thyroid: No thyromegaly. Trachea: Trachea normal.   Cardiovascular:      Rate and Rhythm: Normal rate and regular rhythm. No extrasystoles are present. Chest Wall: PMI is not displaced. Heart sounds: Normal heart sounds. No murmur heard. No friction rub. No gallop. Pulmonary:      Effort: Pulmonary effort is normal. No respiratory distress. Breath sounds: Normal breath sounds. Chest:   Breasts:      Right: No supraclavicular adenopathy. Left: No supraclavicular adenopathy. Musculoskeletal:      Cervical back: Normal range of motion and neck supple. Lymphadenopathy:      Head:      Right side of head: No submental, submandibular, tonsillar or occipital adenopathy. Left side of head: No submental, submandibular, tonsillar or occipital adenopathy. Cervical: No cervical adenopathy. Upper Body:      Right upper body: No supraclavicular adenopathy. Left upper body: No supraclavicular adenopathy. Skin:     General: Skin is warm and dry. Capillary Refill: Capillary refill takes less than 2 seconds. Coloration: Skin is not jaundiced or pale. Findings: No rash. Comments: Skin warm and dry to touch, no rashes noted on exposed surfaces. Neurological:      Mental Status: She is alert and oriented to person, place, and time. She is not disoriented. Psychiatric:         Mood and Affect: Mood normal.         Behavior: Behavior normal. Behavior is cooperative.          DIAGNOSTIC RESULTS   Labs:   Results for orders placed or performed during the hospital encounter of 05/01/22   Strep A culture, throat   Result Value Ref Range    REFLEX THROAT C + S INDICATED    STREP A ANTIGEN   Result Value Ref Range    GROUP A STREP CULTURE, REFLEX Negative        IMAGING:  No orders to display     URGENT CARE COURSE:     Vitals:    05/01/22 1317   BP: 137/83   Pulse: 89   Resp: 18   Temp: 98 °F (36.7 °C)   SpO2: 97%   Weight: 203 lb (92.1 kg)   Height: 5' 6\" (1.676 m)       Medications - No data to display  PROCEDURES:  None  FINALIMPRESSION      1. Acute viral tonsillitis        DISPOSITION/PLAN   DISPOSITION Decision To Discharge 05/01/2022 01:43:29 PM  Nontoxic, no distress. Strep negative, defer treatment pending culture. Exam consistent with viral tonsillitis. Over-the-counter treatment as needed. Advance diet as tolerated, increase fluids. If symptoms worsen return or go to ER. PATIENT REFERRED TO:  ABHISHEK Ash CNP  [facility] [address]  [phone]      Follow up as needed. OTC medication as needed. Advance diet as tolerated, increase fluids. If worse go to ER.     DISCHARGE MEDICATIONS:  Discharge Medication List as of 5/1/2022  1:44 PM        Discharge Medication List as of 5/1/2022  1:44 PM          Winifred Oconnor [address], APRN - CNP  05/01/22 1341

## 2022-05-03 LAB — THROAT/NOSE CULTURE: NORMAL

## 2022-05-18 ENCOUNTER — OFFICE VISIT (OUTPATIENT)
Dept: FAMILY MEDICINE CLINIC | Age: 22
End: 2022-05-18
Payer: MEDICARE

## 2022-05-18 VITALS
BODY MASS INDEX: 31.63 KG/M2 | WEIGHT: 196.8 LBS | HEIGHT: 66 IN | OXYGEN SATURATION: 98 % | HEART RATE: 86 BPM | TEMPERATURE: 99.4 F | RESPIRATION RATE: 14 BRPM | SYSTOLIC BLOOD PRESSURE: 138 MMHG | DIASTOLIC BLOOD PRESSURE: 86 MMHG

## 2022-05-18 DIAGNOSIS — N89.8 VAGINAL DISCHARGE: ICD-10-CM

## 2022-05-18 DIAGNOSIS — Z72.51 UNPROTECTED SEXUAL INTERCOURSE: ICD-10-CM

## 2022-05-18 DIAGNOSIS — R30.0 DYSURIA: Primary | ICD-10-CM

## 2022-05-18 LAB
BILIRUBIN URINE: NEGATIVE
BLOOD URINE, POC: NEGATIVE
CHARACTER, URINE: CLEAR
COLOR, URINE: YELLOW
GLUCOSE URINE: NEGATIVE MG/DL
KETONES, URINE: NEGATIVE
LEUKOCYTE CLUMPS, URINE: NEGATIVE
NITRITE, URINE: NEGATIVE
PH, URINE: 6 (ref 5–9)
PROTEIN, URINE: NEGATIVE MG/DL
SPECIFIC GRAVITY, URINE: 1.02 (ref 1–1.03)
UROBILINOGEN, URINE: 0.2 EU/DL (ref 0–1)

## 2022-05-18 PROCEDURE — G8427 DOCREV CUR MEDS BY ELIG CLIN: HCPCS | Performed by: NURSE PRACTITIONER

## 2022-05-18 PROCEDURE — 1036F TOBACCO NON-USER: CPT | Performed by: NURSE PRACTITIONER

## 2022-05-18 PROCEDURE — 99213 OFFICE O/P EST LOW 20 MIN: CPT | Performed by: NURSE PRACTITIONER

## 2022-05-18 PROCEDURE — G8417 CALC BMI ABV UP PARAM F/U: HCPCS | Performed by: NURSE PRACTITIONER

## 2022-05-18 PROCEDURE — 81003 URINALYSIS AUTO W/O SCOPE: CPT | Performed by: NURSE PRACTITIONER

## 2022-05-18 RX ORDER — FLUCONAZOLE 150 MG/1
150 TABLET ORAL
Qty: 2 TABLET | Refills: 0 | Status: SHIPPED | OUTPATIENT
Start: 2022-05-18 | End: 2022-05-24

## 2022-05-18 NOTE — PROGRESS NOTES
Alla Brady is a 25 y.o. female for   Chief Complaint   Patient presents with    Dysuria     notices discharge. does have some abdominal cramping. noticed for a few days       Complains of white, copious and thick vaginal discharge for 1 week(s). Currently sexually active? Yes  Current number of sexual partners? 1  Recent STD exposure? She had unprotected intercourse. History of sexually transmitted disease? Yes Treated? yes  LMP? Patient's last menstrual period was 04/27/2022. General ROS: positive for  - abdominal cramping, getting ready to start her period, is on birth control pills   Genito-Urinary ROS: positive for - dysuria and thick white vaginal D/C and vaginal itching    Urinary Symptoms    HPI:    Symptoms present for 3 days. Symptoms are unchanged since they initially started. Dysuria? Yes  Hematuria? No  Increased urinary frequency? No  Abdominal discomfort? Yes  CVA pain? No  Hx of UTIs? No  Sexually active? Yes  LMP? Patient's last menstrual period was 04/27/2022. Advised pt to have a pelvic exam and check for trichomonas and she declines. Did have STI testing 2 weeks ago that was negative            OBJECTIVE:  /86   Pulse 86   Temp 99.4 °F (37.4 °C) (Oral)   Resp 14   Ht 5' 6\" (1.676 m)   Wt 196 lb 12.8 oz (89.3 kg)   LMP 04/27/2022   SpO2 98%   BMI 31.76 kg/m²   General appearance: alert, well appearing, and in no distress. CVS exam: normal rate, regular rhythm, normal S1, S2, no murmurs, rubs, clicks or gallops. Chest: clear to auscultation, no wheezes, rales or rhonchi, symmetric air entry. Abdominal exam: soft, nontender, nondistended, no masses or organomegaly.  Female Exam:  Vulva: normal appearing vulva with no masses, tenderness or lesions Vagina: not indicated and patient declines pelvic exam        ASSESSMENT & PLAN  Meri was seen today for dysuria.     Diagnoses and all orders for this visit:    Dysuria  -     POCT Urinalysis No Micro (Auto)-normal  Will treat as vaginal candida  Pt declines trichomonas testing   Vaginal discharge  -     C. Trachomatis / N. Gonorrhoeae, DNA Probe  Agrees to testing for above   Unprotected sexual intercourse  -     C. Trachomatis / N. Gonorrhoeae, DNA Probe  Advised to use condom to protect against STI  Other orders  -     POCT Urinalysis No Micro (Auto)  -     fluconazole (DIFLUCAN) 150 MG tablet; Take 1 tablet by mouth every 72 hours for 6 days    Pt in agreement with plan     Return if symptoms worsen or fail to improve. Follow-up: Will call patient with results of testing and treat if necessary. RTC PRN.

## 2022-05-19 ENCOUNTER — TELEPHONE (OUTPATIENT)
Dept: FAMILY MEDICINE CLINIC | Age: 22
End: 2022-05-19

## 2022-05-19 LAB
CHLAMYDIA TRACHOMATIS BY RT-PCR: NOT DETECTED
CT/NG SOURCE: NORMAL
NEISSERIA GONORRHOEAE BY RT-PCR: NOT DETECTED

## 2022-05-19 NOTE — TELEPHONE ENCOUNTER
----- Message from Jennifer Riedel, APRN - CNP sent at 5/19/2022  7:52 AM EDT -----  Let pt know her gonorrhea/chlamydia came back negative, so no cause of her vaginal d/C or burning was found, she can monitor if it continues f/u in office and have pelvic exam for other testing as discussed at her appt

## 2022-06-07 RX ORDER — METRONIDAZOLE 500 MG/1
500 TABLET ORAL 2 TIMES DAILY
Qty: 14 TABLET | Refills: 0 | Status: SHIPPED | OUTPATIENT
Start: 2022-06-07 | End: 2022-06-14

## 2022-06-13 ENCOUNTER — OFFICE VISIT (OUTPATIENT)
Dept: FAMILY MEDICINE CLINIC | Age: 22
End: 2022-06-13
Payer: MEDICARE

## 2022-06-13 VITALS
RESPIRATION RATE: 14 BRPM | HEART RATE: 81 BPM | OXYGEN SATURATION: 100 % | HEIGHT: 66 IN | BODY MASS INDEX: 31.92 KG/M2 | WEIGHT: 198.6 LBS | SYSTOLIC BLOOD PRESSURE: 136 MMHG | DIASTOLIC BLOOD PRESSURE: 70 MMHG | TEMPERATURE: 99.7 F

## 2022-06-13 DIAGNOSIS — A59.01 TRICHOMONAS VAGINITIS: ICD-10-CM

## 2022-06-13 DIAGNOSIS — N89.8 VAGINAL DISCHARGE: ICD-10-CM

## 2022-06-13 DIAGNOSIS — N89.8 VAGINAL ITCHING: Primary | ICD-10-CM

## 2022-06-13 LAB — TRICHOMONAS VAGINALIS SCREEN: POSITIVE

## 2022-06-13 PROCEDURE — 87808 TRICHOMONAS ASSAY W/OPTIC: CPT | Performed by: NURSE PRACTITIONER

## 2022-06-13 PROCEDURE — 99213 OFFICE O/P EST LOW 20 MIN: CPT | Performed by: NURSE PRACTITIONER

## 2022-06-13 PROCEDURE — 1036F TOBACCO NON-USER: CPT | Performed by: NURSE PRACTITIONER

## 2022-06-13 PROCEDURE — G8417 CALC BMI ABV UP PARAM F/U: HCPCS | Performed by: NURSE PRACTITIONER

## 2022-06-13 PROCEDURE — G8427 DOCREV CUR MEDS BY ELIG CLIN: HCPCS | Performed by: NURSE PRACTITIONER

## 2022-06-13 RX ORDER — METRONIDAZOLE 500 MG/1
2000 TABLET ORAL ONCE
Qty: 4 TABLET | Refills: 0 | Status: SHIPPED | OUTPATIENT
Start: 2022-06-13 | End: 2022-06-13

## 2022-06-13 NOTE — PATIENT INSTRUCTIONS
Patient Education        Trichomoniasis: Care Instructions  Overview  Trichomoniasis is a sexually transmitted infection (STI) caused by a parasite. It's often called trich (say \"trick\"). You can get it by having sex with an infected partner. Trich may cause vaginal itching and a smelly discharge. Butin many cases, there are no symptoms. Trich needs to be treated so that you don't spread it to others. Both you and your sex partner(s) should be treated at the same time so you don't infect eachother again. If you're pregnant, your doctor will talk with you about treatment. Trich may cause problems with pregnancy. You could pass it to your baby during a vaginaldelivery. But this is rare. Follow-up care is a key part of your treatment and safety. Be sure to make and go to all appointments, and call your doctor if you are having problems. It's also a good idea to know your test results and keep alist of the medicines you take. How can you care for yourself at home?  Take your antibiotics as directed. Do not stop taking them just because you feel better. You need to take the full course of antibiotics.  Do not have sex while you are being treated. If your doctor gave you a single dose of antibiotics, do not have sex for one week after being treated and until your partner also has been treated.  Tell your sex partner (or partners) that they will also need to be tested and treated.  Use a cold water compress or cool baths to relieve itching. How can you prevent it? It's easier to prevent an STI than it is to treat one:   Limit your sex partners. The safest sex is with one partner who has sex only with you.  Talk with your partner or partners about STIs before you have sex. Find out if they are at risk for an STI. Remember that it's possible to have an STI and not know it.  Wait to have sex with new partners until you've each been tested.    Don't have sex if you have symptoms of an infection or if you are being treated for an STI.  Use a condom (a male or female condom) every time you have sex. Condoms are the only form of birth control that also helps prevent STIs.  If you're pregnant, be extra careful. Some STIs can be passed to your baby during delivery. Vaccines are available for some STIs, such as HPV. Ask your doctor for moreinformation. When should you call for help? Call your doctor now or seek immediate medical care if:     You have unusual vaginal bleeding.      You have a fever.      You have new discharge from the vagina or penis.      You have pelvic pain. Watch closely for changes in your health, and be sure to contact your doctor if:     You do not get better as expected.      You have any new symptoms or your symptoms get worse. Where can you learn more? Go to [email or web address] and sign in to your [employer] account. Enter G733 in the [facility] box to learn more about \"Trichomoniasis: Care Instructions. \"     If you do not have an account, please click on the \"Sign Up Now\" link. Current as of: November 17, 2021               Content Version: 13.2  © 1887-9187 Healthwise, Incorporated. Care instructions adapted under license by [facility] ([facility]). If you have questions about a medical condition or this instruction, always ask your healthcare professional. [address] any warranty or liability for your use of this information.        Pt needs to have partner tested and treated  No intercourse for 2 weeks

## 2022-06-13 NOTE — PROGRESS NOTES
Denis Epperson is a 25 y.o. female for   Chief Complaint   Patient presents with    Gynecologic Exam       Complains of white vaginal discharge for 2 week(s). Currently sexually active? Yes  Current number of sexual partners? 1  Recent STD exposure? She has had intercourse in the last several weeks did use a condom. History of sexually transmitted disease? No Treated? no  LMP? No LMP recorded. General ROS: positive for  - vaginal itching and D/X did take flagyl recently thinks the itching may be a little better. Genito-Urinary ROS: no dysuria, trouble voiding, or hematuria      OBJECTIVE:  /70   Pulse 81   Temp 99.7 °F (37.6 °C) (Oral)   Resp 14   Ht 5' 6\" (1.676 m)   Wt 198 lb 9.6 oz (90.1 kg)   SpO2 100%   BMI 32.05 kg/m²   General appearance: alert, well appearing, and in no distress. CVS exam: normal rate, regular rhythm, normal S1, S2, no murmurs, rubs, clicks or gallops. Chest: clear to auscultation, no wheezes, rales or rhonchi, symmetric air entry. Abdominal exam: soft, nontender, nondistended, no masses or organomegaly.  Female Exam:  Vulva: normal appearing vulva with no masses, tenderness or lesions Vagina: normal appearing vagina with normal color and discharge, no lesions and thick yellow to white D/c noted  Cervix: cervical motion tenderness absent and no vulvar erythema noted        ASSESSMENT & PLAN  Meri was seen today for gynecologic exam.    Diagnoses and all orders for this visit:    Vaginal itching  R/o gonorrhea/chlamydia vs trich vs candida vd BV  Use condoms for intercourse  Continue with OCP's for pregnancy prevention   Pt in agreement with plan   -     C. Trachomatis / N. Gonorrhoeae, DNA Probe  -     POCT Trichomonas Rapid Test-positive   -     Culture, Aerobic  Metronidazole ordered  No intercourse for 2 weeks  Have partner tested   Vaginal discharge  -     C. Trachomatis / N.  Gonorrhoeae, DNA Probe  -     POCT Trichomonas Rapid Test  - Culture, Aerobic    Pt in agreement with plan     No follow-ups on file. Follow-up: Will call patient with results of testing and treat if necessary. RTC PRN.

## 2022-06-14 ENCOUNTER — TELEPHONE (OUTPATIENT)
Dept: FAMILY MEDICINE CLINIC | Age: 22
End: 2022-06-14

## 2022-06-14 NOTE — TELEPHONE ENCOUNTER
----- Message from ABHISHEK Ocampo CNP sent at 6/14/2022  7:38 AM EDT -----  Let pt know her gonorrhea and chlamydia test were negative, continue with the plan discussed in the office

## 2022-06-16 ENCOUNTER — TELEPHONE (OUTPATIENT)
Dept: FAMILY MEDICINE CLINIC | Age: 22
End: 2022-06-16

## 2022-06-16 LAB
GENITAL CULTURE, ROUTINE: ABNORMAL
GENITAL CULTURE, ROUTINE: ABNORMAL
GRAM STAIN RESULT: ABNORMAL
ORGANISM: ABNORMAL

## 2022-06-16 RX ORDER — PENICILLIN V POTASSIUM 500 MG/1
500 TABLET ORAL 4 TIMES DAILY
Qty: 40 TABLET | Refills: 0 | Status: SHIPPED | OUTPATIENT
Start: 2022-06-16 | End: 2022-06-26

## 2022-06-16 NOTE — TELEPHONE ENCOUNTER
Patient has been informed and voiced understanding and states that she will go ahead and start the PCN and call Monday to update Devyn how she's doing with it

## 2022-06-16 NOTE — TELEPHONE ENCOUNTER
I do understand her concern, the flagyl that she took was an antifungal medication to treat the trichomonas, the PCN just ordered is treating a bacterial vaginal infection that she has. The different medications treat different infections. IF she would like to wait a week before taking the PCN, that was ordered today, she can if she feels more comfortable doing that.   Let me know if she has any additional questions

## 2022-06-16 NOTE — TELEPHONE ENCOUNTER
Called pt and advised of results. She states she just took an antibiotic and was hesitant to take another one. Pt wanted to let Devyn know this.

## 2022-06-16 NOTE — TELEPHONE ENCOUNTER
----- Message from ABHISHEK Hammond CNP sent at 6/16/2022  9:16 AM EDT -----  Let pt know her final vaginal culture has come back and she is growing a  group B strep infetcion, this can be treated with PCN and I will send this antibiotic  in for her. This is not a STI, it is an infection women can commonly get. Let me know if she has any questions.

## 2022-07-19 RX ORDER — AMLODIPINE BESYLATE 5 MG/1
TABLET ORAL
Qty: 90 TABLET | Refills: 1 | Status: SHIPPED | OUTPATIENT
Start: 2022-07-19 | End: 2022-08-16 | Stop reason: SDUPTHER

## 2022-07-19 NOTE — TELEPHONE ENCOUNTER
Recent Visits  Date Type Provider Dept   06/13/22 Office Visit Iline Phalen, APRN - CNP [facility]   05/18/22 Office Visit Iline Phalen, APRN - CNP [facility]   03/10/22 Office Visit Iline Phalen, APRN - CNP [facility]   09/15/21 Office Visit Altagraciane Phalen, APRN - CNP [facility]   07/26/21 Office Visit Iline Phalen, APRN - CNP [facility]   03/16/21 Office Visit Altagraciamonique GonzalezABHISHEK meza CNP [facility]   Showing recent visits within past 540 days with a meds authorizing provider and meeting all other requirements  Future Appointments  Date Type Provider Dept   07/27/22 Appointment Iline Phalen, APRN - CNP [facility]   Showing future appointments within next 150 days with a meds authorizing provider and meeting all other requirements    Future Appointments   Date Time Provider Sammie Martinez   7/27/2022  1:00 PM Iline Phalen, APRN - [address]

## 2022-08-16 ENCOUNTER — OFFICE VISIT (OUTPATIENT)
Dept: FAMILY MEDICINE CLINIC | Age: 22
End: 2022-08-16
Payer: MEDICARE

## 2022-08-16 VITALS
BODY MASS INDEX: 32.59 KG/M2 | OXYGEN SATURATION: 97 % | SYSTOLIC BLOOD PRESSURE: 132 MMHG | HEART RATE: 87 BPM | HEIGHT: 66 IN | WEIGHT: 202.8 LBS | DIASTOLIC BLOOD PRESSURE: 74 MMHG | TEMPERATURE: 98.8 F | RESPIRATION RATE: 16 BRPM

## 2022-08-16 DIAGNOSIS — I10 PRIMARY HYPERTENSION: ICD-10-CM

## 2022-08-16 DIAGNOSIS — Z13.6 SCREENING FOR CARDIOVASCULAR CONDITION: ICD-10-CM

## 2022-08-16 DIAGNOSIS — Z13.31 DEPRESSION SCREENING NEGATIVE: ICD-10-CM

## 2022-08-16 DIAGNOSIS — Z00.00 ENCOUNTER FOR WELL ADULT EXAM WITHOUT ABNORMAL FINDINGS: Primary | ICD-10-CM

## 2022-08-16 DIAGNOSIS — Z30.41 SURVEILLANCE FOR BIRTH CONTROL, ORAL CONTRACEPTIVES: ICD-10-CM

## 2022-08-16 PROCEDURE — 99395 PREV VISIT EST AGE 18-39: CPT | Performed by: NURSE PRACTITIONER

## 2022-08-16 PROCEDURE — G0446 INTENS BEHAVE THER CARDIO DX: HCPCS | Performed by: NURSE PRACTITIONER

## 2022-08-16 RX ORDER — LOSARTAN POTASSIUM 100 MG/1
TABLET ORAL
Qty: 90 TABLET | Refills: 4 | Status: SHIPPED | OUTPATIENT
Start: 2022-08-16

## 2022-08-16 RX ORDER — NORGESTIMATE AND ETHINYL ESTRADIOL 0.25-0.035
1 KIT ORAL DAILY
Qty: 3 PACKET | Refills: 3 | Status: SHIPPED | OUTPATIENT
Start: 2022-08-16

## 2022-08-16 RX ORDER — AMLODIPINE BESYLATE 5 MG/1
TABLET ORAL
Qty: 90 TABLET | Refills: 4 | Status: SHIPPED | OUTPATIENT
Start: 2022-08-16 | End: 2022-10-18 | Stop reason: SDUPTHER

## 2022-08-16 SDOH — ECONOMIC STABILITY: FOOD INSECURITY: WITHIN THE PAST 12 MONTHS, YOU WORRIED THAT YOUR FOOD WOULD RUN OUT BEFORE YOU GOT MONEY TO BUY MORE.: NEVER TRUE

## 2022-08-16 SDOH — ECONOMIC STABILITY: FOOD INSECURITY: WITHIN THE PAST 12 MONTHS, THE FOOD YOU BOUGHT JUST DIDN'T LAST AND YOU DIDN'T HAVE MONEY TO GET MORE.: NEVER TRUE

## 2022-08-16 ASSESSMENT — PATIENT HEALTH QUESTIONNAIRE - PHQ9
SUM OF ALL RESPONSES TO PHQ QUESTIONS 1-9: 0
1. LITTLE INTEREST OR PLEASURE IN DOING THINGS: 0
2. FEELING DOWN, DEPRESSED OR HOPELESS: 0
SUM OF ALL RESPONSES TO PHQ9 QUESTIONS 1 & 2: 0

## 2022-08-16 ASSESSMENT — SOCIAL DETERMINANTS OF HEALTH (SDOH): HOW HARD IS IT FOR YOU TO PAY FOR THE VERY BASICS LIKE FOOD, HOUSING, MEDICAL CARE, AND HEATING?: NOT HARD AT ALL

## 2022-08-16 NOTE — PROGRESS NOTES
Chief Complaint   Patient presents with    Annual Exam     No concerns          SUBJECTIVE:  Catarina Greer is a 25 y.o. female for physical exam.    Diet - eats a wide variety of foods   Exercise - no forma activity program   Sleep - sleeps well at night no concerns. Mood - good, denies depression or anxiety       HTN    Does patient check BP regularly at home? - No  Current Medication regimen - cozaar 100 mg daily, norvasc 5 mg daily   Tolerating medications well? - yes    Shortness of breath or chest pain? No  Headache or visual complaints? No  Neurologic changes like confusion? No  Extremity edema? No    Pt of child bearing age on OCP, also advised to use condoms. BP Readings from Last 3 Encounters:   08/16/22 132/74   06/13/22 136/70   05/18/22 138/86    Health Maintenance reviewed with patient today. Past Medical History:   Diagnosis Date    Asthma     Hypertension        No past surgical history on file.     Social History     Socioeconomic History    Marital status: Single     Spouse name: Not on file    Number of children: Not on file    Years of education: Not on file    Highest education level: Not on file   Occupational History    Not on file   Tobacco Use    Smoking status: Never    Smokeless tobacco: Never   Vaping Use    Vaping Use: Never used   Substance and Sexual Activity    Alcohol use: No    Drug use: No    Sexual activity: Never   Other Topics Concern    Not on file   Social History Narrative    Not on file     Social Determinants of Health     Financial Resource Strain: Low Risk     Difficulty of Paying Living Expenses: Not hard at all   Food Insecurity: No Food Insecurity    Worried About Running Out of Food in the Last Year: Never true    Ran Out of Food in the Last Year: Never true   Transportation Needs: Not on file   Physical Activity: Not on file   Stress: Not on file   Social Connections: Not on file   Intimate Partner Violence: Not on file   Housing Stability: Not on file Family History   Problem Relation Age of Onset    High Blood Pressure Mother     High Blood Pressure Father            I have reviewed the patient's past medical history, past surgical history, allergies, medications, social and family history and I have made updates where appropriate. PHYSICAL EXAM:  /74   Pulse 87   Temp 98.8 °F (37.1 °C)   Resp 16   Ht 5' 6\" (1.676 m)   Wt 202 lb 12.8 oz (92 kg)   SpO2 97%   BMI 32.73 kg/m²       Physical Exam  Vitals and nursing note reviewed. Constitutional:       Appearance: She is obese. She is not ill-appearing. HENT:      Right Ear: Tympanic membrane, ear canal and external ear normal.      Left Ear: Tympanic membrane, ear canal and external ear normal.   Neck:      Thyroid: No thyroid mass, thyromegaly or thyroid tenderness. Cardiovascular:      Rate and Rhythm: Normal rate and regular rhythm. Pulses: Normal pulses. Heart sounds: Normal heart sounds. No murmur heard. Pulmonary:      Effort: Pulmonary effort is normal. No respiratory distress. Breath sounds: Normal breath sounds. Abdominal:      General: Abdomen is flat. Bowel sounds are normal. There is no distension. Palpations: Abdomen is soft. Musculoskeletal:      Cervical back: Full passive range of motion without pain. Skin:     General: Skin is warm and dry. Capillary Refill: Capillary refill takes less than 2 seconds. Neurological:      Mental Status: She is alert. Psychiatric:         Mood and Affect: Mood normal.         Behavior: Behavior normal.         Thought Content:  Thought content normal.         Judgment: Judgment normal.           ASSESSMENT & PLAN  Meri was seen today for annual exam.    Diagnoses and all orders for this visit:    Encounter for well adult exam without abnormal findings    Depression screening negative    Primary hypertension  Due to being of child bearing age will not use ACE-I, pt controlled on ARB and CCB  Pt not planning on pregnancy at this time. -     CBC with Auto Differential; Future  -     Basic Metabolic Panel; Future  -     Hepatic Function Panel; Future  -     Lipid Panel; Future    BMI 32.0-32.9,adult  We discussed carb counting 4504-3528 calories a day  Increasing physical activity  Discussed weight loss  -     Basic Metabolic Panel; Future  -     Hepatic Function Panel; Future  -     Lipid Panel; Future  -     TSH With Reflex Ft4; Future    Screening for cardiovascular condition  -     MD Intens behave ther cardio dx, 15 minutes []    Surveillance for birth control, oral contraceptives  -     norgestimate-ethinyl estradiol (ORTHO-CYCLEN, 28,) 0.25-35 MG-MCG per tablet; Take 1 tablet by mouth in the morning. Other orders  -     losartan (COZAAR) 100 MG tablet; TAKE 1 TABLET BY MOUTH EVERY DAY  -     amLODIPine (NORVASC) 5 MG tablet; TAKE 1 TABLET BY MOUTH EVERY DAY  Pt in agreement with plan  Chronic conditions stable. Update labs. Return in about 1 year (around 8/16/2023) for Physical Exam .    Counseling was provided today regarding the following topics: Healthy eating habits, Regular exercise, substance abuse and healthy sleep habits. Obesity Counseling: Assessed behavioral health risks and factors affecting choice of behavior. Suggested weight control approaches, including dietary changes behavioral modification and follow up plan. Provided educational and support documentation. Time spent (minutes): 6  Cardiovascular Disease Risk Counseling: Assessed the patient's risk to develop cardiovascular disease and reviewed main risk factors.    Reviewed steps to reduce disease risk including:   Quitting tobacco use, reducing amount smoked, or not starting the habit  Making healthy food choices  Being physically active and gradualy increasing activity levels   Reduce weight and determine a healthy BMI goal  Monitor blood pressure and treat if higher than 140/90 mmHg  Maintain blood total cholesterol levels under 5 mmol/l or 190 mg/dl  Maintain LDL cholesterol levels under 3.0 mmol/l or 115 mg/dl   Control blood glucose levels  Consider taking aspirin (75 mg daily), once blood pressure is controlled   Provided a follow up plan.   Time spent (minutes): 4

## 2022-08-17 ENCOUNTER — HOSPITAL ENCOUNTER (OUTPATIENT)
Age: 22
Discharge: HOME | End: 2022-08-17
Payer: MEDICARE

## 2022-08-17 DIAGNOSIS — I10 PRIMARY HYPERTENSION: ICD-10-CM

## 2022-08-17 LAB
ALBUMIN SERPL-MCNC: 4.4 G/DL (ref 3.5–5.1)
ALP BLD-CCNC: 68 U/L (ref 38–126)
ALT SERPL-CCNC: 15 U/L (ref 11–66)
ANION GAP SERPL CALCULATED.3IONS-SCNC: 12 MEQ/L (ref 8–16)
AST SERPL-CCNC: 18 U/L (ref 5–40)
BASOPHILS # BLD: 0.4 %
BASOPHILS ABSOLUTE: 0 THOU/MM3 (ref 0–0.1)
BILIRUB SERPL-MCNC: 0.5 MG/DL (ref 0.3–1.2)
BILIRUBIN DIRECT: < 0.2 MG/DL (ref 0–0.3)
BUN BLDV-MCNC: 8 MG/DL (ref 7–22)
CALCIUM SERPL-MCNC: 9.7 MG/DL (ref 8.5–10.5)
CHLORIDE BLD-SCNC: 102 MEQ/L (ref 98–111)
CHOLESTEROL, TOTAL: 184 MG/DL (ref 100–199)
CO2: 28 MEQ/L (ref 23–33)
CREAT SERPL-MCNC: 0.6 MG/DL (ref 0.4–1.2)
EOSINOPHIL # BLD: 0.8 %
EOSINOPHILS ABSOLUTE: 0.1 THOU/MM3 (ref 0–0.4)
ERYTHROCYTE [DISTWIDTH] IN BLOOD BY AUTOMATED COUNT: 12.6 % (ref 11.5–14.5)
ERYTHROCYTE [DISTWIDTH] IN BLOOD BY AUTOMATED COUNT: 38.6 FL (ref 35–45)
GFR SERPL CREATININE-BSD FRML MDRD: > 90 ML/MIN/1.73M2
GLUCOSE BLD-MCNC: 93 MG/DL (ref 70–108)
HCT VFR BLD CALC: 42.4 % (ref 37–47)
HDLC SERPL-MCNC: 43 MG/DL
HEMOGLOBIN: 14.2 GM/DL (ref 12–16)
IMMATURE GRANS (ABS): 0.03 THOU/MM3 (ref 0–0.07)
IMMATURE GRANULOCYTES: 0.3 %
LDL CHOLESTEROL CALCULATED: 109 MG/DL
LYMPHOCYTES # BLD: 25.4 %
LYMPHOCYTES ABSOLUTE: 2.4 THOU/MM3 (ref 1–4.8)
MCH RBC QN AUTO: 28.5 PG (ref 26–33)
MCHC RBC AUTO-ENTMCNC: 33.5 GM/DL (ref 32.2–35.5)
MCV RBC AUTO: 85.1 FL (ref 81–99)
MONOCYTES # BLD: 7.9 %
MONOCYTES ABSOLUTE: 0.8 THOU/MM3 (ref 0.4–1.3)
NUCLEATED RED BLOOD CELLS: 0 /100 WBC
PLATELET # BLD: 292 THOU/MM3 (ref 130–400)
PMV BLD AUTO: 12 FL (ref 9.4–12.4)
POTASSIUM SERPL-SCNC: 3.6 MEQ/L (ref 3.5–5.2)
RBC # BLD: 4.98 MILL/MM3 (ref 4.2–5.4)
SEG NEUTROPHILS: 65.2 %
SEGMENTED NEUTROPHILS ABSOLUTE COUNT: 6.3 THOU/MM3 (ref 1.8–7.7)
SODIUM BLD-SCNC: 142 MEQ/L (ref 135–145)
TOTAL PROTEIN: 7.6 G/DL (ref 6.1–8)
TRIGL SERPL-MCNC: 161 MG/DL (ref 0–199)
TSH SERPL DL<=0.05 MIU/L-ACNC: 0.95 UIU/ML (ref 0.4–4.2)
WBC # BLD: 9.6 THOU/MM3 (ref 4.8–10.8)

## 2022-08-17 PROCEDURE — 84443 ASSAY THYROID STIM HORMONE: CPT

## 2022-08-17 PROCEDURE — 82248 BILIRUBIN DIRECT: CPT

## 2022-08-17 PROCEDURE — 80061 LIPID PANEL: CPT

## 2022-08-17 PROCEDURE — 80053 COMPREHEN METABOLIC PANEL: CPT

## 2022-08-17 PROCEDURE — 85025 COMPLETE CBC W/AUTO DIFF WBC: CPT

## 2022-08-17 PROCEDURE — 36415 COLL VENOUS BLD VENIPUNCTURE: CPT

## 2022-08-18 ENCOUNTER — TELEPHONE (OUTPATIENT)
Dept: FAMILY MEDICINE CLINIC | Age: 22
End: 2022-08-18

## 2022-08-18 NOTE — TELEPHONE ENCOUNTER
----- Message from ABHISHEK Boyd - CNP sent at 8/18/2022  7:33 AM EDT -----  Let pt know all labs are in normal range

## 2022-09-28 DIAGNOSIS — R06.7 SNEEZING: ICD-10-CM

## 2022-09-28 DIAGNOSIS — J30.89 SEASONAL ALLERGIC RHINITIS DUE TO OTHER ALLERGIC TRIGGER: ICD-10-CM

## 2022-09-28 RX ORDER — LORATADINE 10 MG/1
TABLET ORAL
Qty: 90 TABLET | Refills: 3 | Status: SHIPPED | OUTPATIENT
Start: 2022-09-28

## 2022-09-28 NOTE — TELEPHONE ENCOUNTER
Recent Visits  Date Type Provider Dept   08/16/22 Office Visit ABHISHEK Naik CNP [facility]   06/13/22 Office Visit Pilar Quinteros, BAHISHEK - CNP [facility]   05/18/22 Office Visit Pilar Quinteros, ABHISHEK - CNP [facility]   03/10/22 Office Visit Pilar Quinteros, ABHISHEK - CNP [facility]   09/15/21 Office Visit ABHISHEK Naik - CNP [facility]   07/26/21 Office Visit Pilar Quinteros, APRN - CNP [facility]   Showing recent visits within past 540 days with a meds authorizing provider and meeting all other requirements  Future Appointments  No visits were found meeting these conditions.   Showing future appointments within next 150 days with a meds authorizing provider and meeting all other requirements    Future Appointments   Date Time Provider Sammie Martinez   6/27/2023  7:40 AM ABHISHEK Naik 86

## 2022-10-01 ENCOUNTER — HOSPITAL ENCOUNTER (EMERGENCY)
Age: 22
Discharge: HOME | End: 2022-10-01
Payer: MEDICARE

## 2022-10-01 VITALS
SYSTOLIC BLOOD PRESSURE: 179 MMHG | DIASTOLIC BLOOD PRESSURE: 100 MMHG | BODY MASS INDEX: 32.93 KG/M2 | HEART RATE: 86 BPM | RESPIRATION RATE: 16 BRPM | TEMPERATURE: 97.6 F | OXYGEN SATURATION: 97 % | WEIGHT: 204 LBS

## 2022-10-01 DIAGNOSIS — Z72.51 UNPROTECTED SEXUAL INTERCOURSE: ICD-10-CM

## 2022-10-01 DIAGNOSIS — Z11.3 ROUTINE SCREENING FOR STI (SEXUALLY TRANSMITTED INFECTION): Primary | ICD-10-CM

## 2022-10-01 DIAGNOSIS — R30.0 DYSURIA: ICD-10-CM

## 2022-10-01 DIAGNOSIS — R03.0 ELEVATED BLOOD PRESSURE READING: ICD-10-CM

## 2022-10-01 LAB
BILIRUBIN URINE: NEGATIVE
BLOOD, URINE: ABNORMAL
CHARACTER, URINE: CLEAR
CHLAMYDIA TRACHOMATIS BY RT-PCR: NOT DETECTED
COLOR: YELLOW
CT/NG SOURCE: NORMAL
GLUCOSE URINE: NEGATIVE MG/DL
KETONES, URINE: NEGATIVE
LEUKOCYTE ESTERASE, URINE: ABNORMAL
NEISSERIA GONORRHOEAE BY RT-PCR: NOT DETECTED
NITRITE, URINE: NEGATIVE
PH, URINE: 5.5 (ref 5–9)
PREGNANCY, URINE: NEGATIVE
PROTEIN, URINE: NEGATIVE MG/DL
SPECIFIC GRAVITY, URINE: 1.02 (ref 1–1.03)
TRICHOMONAS PREP: NEGATIVE
UROBILINOGEN, URINE: 0.2 EU/DL (ref 0.2–1)

## 2022-10-01 PROCEDURE — 99213 OFFICE O/P EST LOW 20 MIN: CPT | Performed by: NURSE PRACTITIONER

## 2022-10-01 PROCEDURE — 81003 URINALYSIS AUTO W/O SCOPE: CPT

## 2022-10-01 PROCEDURE — 87086 URINE CULTURE/COLONY COUNT: CPT

## 2022-10-01 PROCEDURE — 81025 URINE PREGNANCY TEST: CPT

## 2022-10-01 PROCEDURE — 87070 CULTURE OTHR SPECIMN AEROBIC: CPT

## 2022-10-01 PROCEDURE — 87808 TRICHOMONAS ASSAY W/OPTIC: CPT

## 2022-10-01 PROCEDURE — 87591 N.GONORRHOEAE DNA AMP PROB: CPT

## 2022-10-01 PROCEDURE — 99213 OFFICE O/P EST LOW 20 MIN: CPT

## 2022-10-01 PROCEDURE — 87491 CHLMYD TRACH DNA AMP PROBE: CPT

## 2022-10-01 PROCEDURE — 87205 SMEAR GRAM STAIN: CPT

## 2022-10-01 RX ORDER — CIPROFLOXACIN 250 MG/1
250 TABLET, FILM COATED ORAL 2 TIMES DAILY
Qty: 6 TABLET | Refills: 0 | Status: SHIPPED | OUTPATIENT
Start: 2022-10-01 | End: 2022-10-04

## 2022-10-01 ASSESSMENT — ENCOUNTER SYMPTOMS
EYE REDNESS: 0
COUGH: 0
VOMITING: 0
ABDOMINAL PAIN: 0
NAUSEA: 0
SHORTNESS OF BREATH: 0
DIARRHEA: 0
EYE ITCHING: 0

## 2022-10-01 ASSESSMENT — PAIN - FUNCTIONAL ASSESSMENT: PAIN_FUNCTIONAL_ASSESSMENT: NONE - DENIES PAIN

## 2022-10-01 NOTE — ED PROVIDER NOTES
[address] Laisha Sandoval       Chief Complaint   Patient presents with    Exposure to STD     Vaginal odor       Nurses Notes reviewed and I agree except as noted in the HPI. HISTORY OF PRESENT ILLNESS   Leopold Sheen is a 25 y.o. female who presents for evaluation of vaginal odor and STD exposure. Symptoms start a couple of days ago. Hx of trichomonas in June 2022. She is with the same partner, who she states was also treated, but does not always use protection. The patient/patient representative has no other acute complaints at this time. REVIEW OF SYSTEMS     Review of Systems   Constitutional:  Negative for chills, fatigue and fever. Eyes:  Negative for redness and itching. Respiratory:  Negative for cough and shortness of breath. Cardiovascular:  Negative for chest pain. Gastrointestinal:  Negative for abdominal pain, diarrhea, nausea and vomiting. Genitourinary:  Positive for dysuria and vaginal discharge (with odor). Negative for flank pain, frequency, hematuria and urgency. Skin:  Negative for rash. Allergic/Immunologic: Negative for environmental allergies and food allergies. Neurological:  Negative for headaches. PAST MEDICAL HISTORY         Diagnosis Date    Asthma     Hypertension        SURGICAL HISTORY     Patient  has no past surgical history on file. CURRENT MEDICATIONS       Previous Medications    AMLODIPINE (NORVASC) 5 MG TABLET    TAKE 1 TABLET BY MOUTH EVERY DAY    IBUPROFEN (IBU) 400 MG TABLET    Take 1 tablet by mouth every 6 hours as needed for Pain    LORATADINE (CLARITIN) 10 MG TABLET    TAKE 1 TABLET BY MOUTH EVERY DAY    LOSARTAN (COZAAR) 100 MG TABLET    TAKE 1 TABLET BY MOUTH EVERY DAY    MULTIPLE VITAMIN (MULTI VITAMIN DAILY PO)    Take by mouth    NORGESTIMATE-ETHINYL ESTRADIOL (ORTHO-CYCLEN, 28,) 0.25-35 MG-MCG PER TABLET    Take 1 tablet by mouth in the morning.     SALICYLIC ACID (SELSUN BLUE DEEP CLEANSING) 3 % SHAM    Apply 1 applicator topically daily    TERBINAFINE (LAMISIL) 1 % CREAM    Apply topically 2 times daily. ALLERGIES     Patient is has No Known Allergies. FAMILY HISTORY     Patient'sfamily history includes High Blood Pressure in her father and mother. SOCIAL HISTORY     Patient  reports that she has never smoked. She has never used smokeless tobacco. She reports that she does not drink alcohol and does not use drugs. PHYSICAL EXAM     ED TRIAGE VITALS  BP: (!) 179/100, Temp: 97.6 °F (36.4 °C), Heart Rate: 86, Resp: 16, SpO2: 97 %  Physical Exam  Vitals and nursing note reviewed. Constitutional:       General: She is not in acute distress. Appearance: Normal appearance. She is well-developed and well-groomed. HENT:      Head: Normocephalic and atraumatic. Right Ear: External ear normal.      Left Ear: External ear normal.      Mouth/Throat:      Lips: Pink. Mouth: Mucous membranes are moist.   Eyes:      Conjunctiva/sclera: Conjunctivae normal.      Right eye: Right conjunctiva is not injected. Left eye: Left conjunctiva is not injected. Pupils: Pupils are equal.   Cardiovascular:      Rate and Rhythm: Normal rate. Heart sounds: Normal heart sounds. Pulmonary:      Effort: Pulmonary effort is normal. No respiratory distress. Breath sounds: Normal breath sounds. Genitourinary:     Comments: Elected to self swab  Musculoskeletal:      Cervical back: Normal range of motion. Skin:     General: Skin is warm and dry. Findings: No rash. Neurological:      Mental Status: She is alert and oriented to person, place, and time.    Psychiatric:         Mood and Affect: Mood normal.         Speech: Speech normal.         Behavior: Behavior normal.       DIAGNOSTIC RESULTS   Labs:  Abnormal Labs Reviewed   URINALYSIS - Abnormal; Notable for the following components:       Result Value    Leukocyte Esterase, Urine Small (*)     All other components within normal limits        IMAGING:  No orders to display     URGENT CARE COURSE:     Vitals:    10/01/22 0911 10/01/22 0914   BP:  (!) 179/100   Pulse:  86   Resp:  16   Temp:  97.6 °F (36.4 °C)   TempSrc:  Temporal   SpO2:  97%   Weight: 204 lb (92.5 kg)        Medications - No data to display  PROCEDURES:  FINALIMPRESSION      1. Routine screening for STI (sexually transmitted infection)    2. Unprotected sexual intercourse    3. Elevated blood pressure reading    4. Dysuria        DISPOSITION/PLAN   DISPOSITION Decision To Discharge 10/01/2022 09:51:13 AM    ED Course as of 10/01/22 0955   Sat Oct 01, 2022   0955 Culture, Urine [HA]   0955 Culture, Genital [HA]   0955 C. Trachomatis / N. Gonorrhoeae, DNA:    CT/NG SOURCE VAGINAL [HA]   0955 Trichomonas screen:    Trichomonas Prep NEGATIVE [HA]   0955 Pregnancy, Urine:    Pregnancy, Urine NEGATIVE [HA]   0955 Leukocyte Esterase, Urine(!): Small [HA]      ED Course User Index  [MCBRIDE] ABHISHEK Mccoy - CNP     Patient declines chlamydia and gonorrhea treatment until test result is known. Follow with PCP for elevated blood pressure reading. Physical assessment findings, diagnostic testing(s) if applicable, and vital signs reviewed with patient/patient representative. Differential diagnosis(s) discussed with patient/patient representative. Prescription medications and/or over-the-counter medications for symptom management discussed. Patient is to follow-up with family care provider in 2-3 days if no improvement. If symptoms should worsen or change, go to the ED. Patient/patient representative is aware of care plan, questions answered, verbalizes understanding and is in agreement. Printed instructions attached to after visit summary. If COVID-19 positive or COVID-19 by PCR is pending at time of discharge patient is to quarantine/isolate according to [facility] guidelines.         Problem List Items Addressed This Visit    None  Visit Diagnoses Routine screening for STI (sexually transmitted infection)    -  Primary    Unprotected sexual intercourse        Elevated blood pressure reading        Dysuria        Relevant Medications    ciprofloxacin (CIPRO) 250 MG tablet              PATIENT REFERRED TO:  ABHISHEK Roque CNP  [place]   [phone]    Schedule an appointment as soon as possible for a visit in 3 days  For further evaluation. , If symptoms change/worsen, go to the [address] [place] [address] Road [facility]  [phone]    Schedule an appointment as soon as possible for a visit   For further evaluation and testing in STI clinic    ABHISHEK Shen CNP    Please note that some or all of this chart was generated using [employer] voice recognition software. Although every effort was made to ensure the accuracy of this automated transcription, some errors in transcription may have occurred.         ABHISHEK Shen CNP  10/01/22 8417

## 2022-10-01 NOTE — DISCHARGE INSTRUCTIONS
Your blood pressure is elevated and will need to be monitored. Have this rechecked at your doctor's office or make an kathya w/ the doctor you were referred to on your discharge instructions to have your blood pressure rechecked sometime within the next 7 days.  Untreated elevated blood pressure puts you at risk for stroke, heart attack, kidney disease and other serious medical conditions

## 2022-10-03 ENCOUNTER — TELEPHONE (OUTPATIENT)
Dept: FAMILY MEDICINE CLINIC | Age: 22
End: 2022-10-03

## 2022-10-03 LAB
ORGANISM: ABNORMAL
URINE CULTURE, ROUTINE: ABNORMAL

## 2022-10-03 NOTE — TELEPHONE ENCOUNTER
----- Message from Z[address] sent at 9/30/2022  8:07 AM EDT -----  Subject: Appointment Request    Reason for Call: Established Patient Appointment needed: Routine (Patient   Request) No Script    QUESTIONS    Reason for appointment request? Available appointments did not meet   patient need     Additional Information for Provider? Patient is requesting a STD test and   flu shot. First availability isn't until 10/6 with Sofia Clarke.  She is   requesting a pelvic exam.  ---------------------------------------------------------------------------  --------------  Norm GILL  [phone]; OK to leave message on voicemail  ---------------------------------------------------------------------------  --------------  SCRIPT ANSWERS  COVID Screen: Valdo Felipe

## 2022-10-04 LAB
GENITAL CULTURE, ROUTINE: NORMAL
GRAM STAIN RESULT: NORMAL

## 2022-10-18 ENCOUNTER — OFFICE VISIT (OUTPATIENT)
Dept: FAMILY MEDICINE CLINIC | Age: 22
End: 2022-10-18
Payer: MEDICARE

## 2022-10-18 VITALS
TEMPERATURE: 98.3 F | DIASTOLIC BLOOD PRESSURE: 90 MMHG | WEIGHT: 205.6 LBS | HEIGHT: 66 IN | BODY MASS INDEX: 33.04 KG/M2 | RESPIRATION RATE: 16 BRPM | SYSTOLIC BLOOD PRESSURE: 138 MMHG | HEART RATE: 89 BPM | OXYGEN SATURATION: 90 %

## 2022-10-18 DIAGNOSIS — I10 PRIMARY HYPERTENSION: Primary | ICD-10-CM

## 2022-10-18 DIAGNOSIS — I10 UNCONTROLLED HYPERTENSION: ICD-10-CM

## 2022-10-18 PROCEDURE — 99213 OFFICE O/P EST LOW 20 MIN: CPT | Performed by: NURSE PRACTITIONER

## 2022-10-18 PROCEDURE — G8484 FLU IMMUNIZE NO ADMIN: HCPCS | Performed by: NURSE PRACTITIONER

## 2022-10-18 PROCEDURE — G8427 DOCREV CUR MEDS BY ELIG CLIN: HCPCS | Performed by: NURSE PRACTITIONER

## 2022-10-18 PROCEDURE — G8417 CALC BMI ABV UP PARAM F/U: HCPCS | Performed by: NURSE PRACTITIONER

## 2022-10-18 PROCEDURE — 1036F TOBACCO NON-USER: CPT | Performed by: NURSE PRACTITIONER

## 2022-10-18 RX ORDER — AMLODIPINE BESYLATE 10 MG/1
TABLET ORAL
Qty: 90 TABLET | Refills: 3 | Status: SHIPPED | OUTPATIENT
Start: 2022-10-18

## 2022-10-18 ASSESSMENT — ENCOUNTER SYMPTOMS: RESPIRATORY NEGATIVE: 1

## 2022-10-18 NOTE — PROGRESS NOTES
[facility] MEDICINE  [address] [place] [address]  Dept: [phone]  Dept Fax: [phone]  Loc: [phone]    Bartolo Martinez is a 25 y.o. femalewho presents today for her medical conditions/complaints as noted below. Meri Rodriguez c/o of Follow-up (Bp check)      HPI:      Pt phoned the office the other day with elevated BP and dizziness, she went to [facility] and had a normal EKG and normal labs, given meds to bring BP down temporarily. Pt continues to take cozaar 1000 mg and norvasc 5 g daily. Will increase norvasc to 10 mg daily and continue with cozaar 100 mg. Pt states she has felt fine no CP or dizziness or H/A or vision changes. Parents with HTN. HTN    Does patient check BP regularly at home? - Yes  Current Medication regimen - cozaar 100 mg daily with amlodipine 5 mg daily   Tolerating medications well? - yes    Shortness of breath or chest pain? No  Headache or visual complaints? No  Neurologic changes like confusion? No  Extremity edema?  No    BP Readings from Last 3 Encounters:  10/18/22 : (!) 138/90  10/01/22 : (!) 179/100  08/16/22 : 132/74    Reviewed labs from [facility]    Lab Results       Component                Value               Date/Time                  NA                       135                 10/12/2022 09:23 PM        K                        3.5                 10/12/2022 09:23 PM        CL                       102                 10/12/2022 09:23 PM        CO2                      25                  10/12/2022 09:23 PM        BUN                      11                  10/12/2022 09:23 PM        CREATININE               0.55                10/12/2022 09:23 PM        GLUCOSE                  76                  10/12/2022 09:23 PM        CALCIUM                  9.50                10/12/2022 09:23 PM                Current Outpatient Medications   Medication Sig Dispense Refill    amLODIPine (NORVASC) 10 MG tablet TAKE 1 TABLET BY MOUTH EVERY DAY 90 tablet 3    loratadine (CLARITIN) 10 MG tablet TAKE 1 TABLET BY MOUTH EVERY DAY 90 tablet 3    losartan (COZAAR) 100 MG tablet TAKE 1 TABLET BY MOUTH EVERY DAY 90 tablet 4    norgestimate-ethinyl estradiol (ORTHO-CYCLEN, 28,) 0.25-35 MG-MCG per tablet Take 1 tablet by mouth in the morning. 3 packet 3    Multiple Vitamin (MULTI VITAMIN DAILY PO) Take by mouth      Salicylic Acid (SELSUN BLUE DEEP CLEANSING) 3 % SHAM Apply 1 applicator topically daily 236 mL 3    terbinafine (LAMISIL) 1 % cream Apply topically 2 times daily. 42 g 2    ibuprofen (IBU) 400 MG tablet Take 1 tablet by mouth every 6 hours as needed for Pain 30 tablet 0     No current facility-administered medications for this visit. Past Medical History:   Diagnosis Date    Asthma     Hypertension       History reviewed. No pertinent surgical history. Family History   Problem Relation Age of Onset    High Blood Pressure Mother     High Blood Pressure Father      Social History     Tobacco Use    Smoking status: Never    Smokeless tobacco: Never   Substance Use Topics    Alcohol use: No        No Known Allergies    Health Maintenance   Topic Date Due    COVID-19 Vaccine (2 - Booster for Azeem series) 11/03/2021    Depression Screen  08/16/2023    Chlamydia/GC screen  10/01/2023    Pap smear  03/10/2025    DTaP/Tdap/Td vaccine (8 - Td or Tdap) 02/03/2031    Hepatitis A vaccine  Completed    Hib vaccine  Completed    HPV vaccine  Completed    Meningococcal (ACWY) vaccine  Completed    Flu vaccine  Completed    Hepatitis C screen  Completed    HIV screen  Completed    Pneumococcal 0-64 years Vaccine  Aged Out    Varicella vaccine  Discontinued       Subjective:      Review of Systems   Respiratory: Negative. Cardiovascular: Negative. Skin: Negative. Neurological:  Negative for dizziness, facial asymmetry, light-headedness and headaches.      Objective:      BP (!) 138/90   Pulse 89   Temp 98.3 °F (36.8 °C)   Resp 16   Ht 5' 6\" (1.676 m)   Wt 205 lb 9.6 oz (93.3 kg)   LMP 09/21/2022   SpO2 90%   BMI 33.18 kg/m²      Physical Exam  Vitals and nursing note reviewed. Constitutional:       Appearance: She is not ill-appearing. Cardiovascular:      Rate and Rhythm: Normal rate and regular rhythm. Pulses: Normal pulses. Heart sounds: Normal heart sounds. No murmur heard. Pulmonary:      Effort: Pulmonary effort is normal. No respiratory distress. Breath sounds: Normal breath sounds. No wheezing. Skin:     General: Skin is warm and dry. Neurological:      Mental Status: She is alert. Assessment/Plan:           1. Primary hypertension  increase norvasc to 10 mg daily  Continue cozaar 100 mg daily   - amLODIPine (NORVASC) 10 MG tablet; TAKE 1 TABLET BY MOUTH EVERY DAY  Dispense: 90 tablet; Refill: 3    2. Uncontrolled hypertension        Return in about 2 weeks (around 11/1/2022) for [place] for BP check. Reccommended tobaccocessation options including pharmacologic methods, counseled great than 3 minutesduring this visit:  Yes[]  No  []       Patient given educational materials -see patient instructions. Discussed use, benefit, and side effects of prescribedmedications. All patient questions answered. Pt voiced understanding. Reviewedhealth maintenance. Instructed to continue current medications, diet and exercise. Patient agreed with treatment plan. Follow up as directed.        Electronicallysigned by ABHISHEK Faust CNP on 10/18/2022 at 3:49 PM

## 2022-10-25 RX ORDER — FLUCONAZOLE 150 MG/1
150 TABLET ORAL
Qty: 2 TABLET | Refills: 0 | Status: SHIPPED | OUTPATIENT
Start: 2022-10-25 | End: 2022-10-31

## 2022-11-03 ENCOUNTER — TELEPHONE (OUTPATIENT)
Dept: FAMILY MEDICINE CLINIC | Age: 22
End: 2022-11-03

## 2022-11-03 ENCOUNTER — NURSE ONLY (OUTPATIENT)
Dept: FAMILY MEDICINE CLINIC | Age: 22
End: 2022-11-03

## 2022-11-03 VITALS — HEART RATE: 84 BPM | DIASTOLIC BLOOD PRESSURE: 82 MMHG | SYSTOLIC BLOOD PRESSURE: 126 MMHG | OXYGEN SATURATION: 99 %

## 2022-11-03 DIAGNOSIS — I10 UNCONTROLLED HYPERTENSION: Primary | ICD-10-CM

## 2022-11-03 NOTE — PROGRESS NOTES
Patient here today for BP check       BP Readings from Last 3 Encounters:   11/03/22 (!) 132/90   10/18/22 (!) 138/90   10/01/22 (!) 179/100     P-84

## 2022-11-22 ENCOUNTER — HOSPITAL ENCOUNTER (OUTPATIENT)
Age: 22
Discharge: HOME | End: 2022-11-22
Payer: MEDICARE

## 2022-11-22 LAB
HEPATITIS B SURFACE ANTIGEN: NEGATIVE
HEPATITIS C ANTIBODY: NEGATIVE
HIV AG/AB: NONREACTIVE

## 2022-11-22 PROCEDURE — 86696 HERPES SIMPLEX TYPE 2 TEST: CPT

## 2022-11-22 PROCEDURE — 86803 HEPATITIS C AB TEST: CPT

## 2022-11-22 PROCEDURE — 86694 HERPES SIMPLEX NES ANTBDY: CPT

## 2022-11-22 PROCEDURE — 87389 HIV-1 AG W/HIV-1&-2 AB AG IA: CPT

## 2022-11-22 PROCEDURE — 87661 TRICHOMONAS VAGINALIS AMPLIF: CPT

## 2022-11-22 PROCEDURE — 86592 SYPHILIS TEST NON-TREP QUAL: CPT

## 2022-11-22 PROCEDURE — 36415 COLL VENOUS BLD VENIPUNCTURE: CPT

## 2022-11-22 PROCEDURE — 87340 HEPATITIS B SURFACE AG IA: CPT

## 2022-11-22 PROCEDURE — 86695 HERPES SIMPLEX TYPE 1 TEST: CPT

## 2022-11-23 LAB — RPR: NONREACTIVE

## 2022-11-26 LAB
HSV 1 GLYCOPROTEIN G AB IGG: 6.5 IV
HSV TYPE 2 GLYCOPROTEIN G-SPECIFIC AB, IGG: 0.52 IV

## 2022-11-27 LAB
APTIMA MEDIA TYPE: NORMAL
T. VAGINALIS SPECIMEN SOURCE: NORMAL
TRICHOMONAS VAGINALIS BY NAA: NEGATIVE

## 2022-11-28 LAB
HERPES TYPE 1/2 IGM COMBINED: 0.59 IV
HERPES TYPE I/II IGG COMBINED: 9.01 IV

## 2023-01-31 ENCOUNTER — TELEPHONE (OUTPATIENT)
Dept: FAMILY MEDICINE CLINIC | Age: 23
End: 2023-01-31

## 2023-01-31 DIAGNOSIS — Z01.83 BLOOD TYPING ENCOUNTER: Primary | ICD-10-CM

## 2023-02-01 ENCOUNTER — HOSPITAL ENCOUNTER (OUTPATIENT)
Age: 23
Discharge: HOME | End: 2023-02-01
Payer: MEDICAID

## 2023-02-01 DIAGNOSIS — Z01.83 BLOOD TYPING ENCOUNTER: ICD-10-CM

## 2023-02-01 LAB
ABO: NORMAL
RH FACTOR: NORMAL

## 2023-02-01 PROCEDURE — 36415 COLL VENOUS BLD VENIPUNCTURE: CPT

## 2023-02-01 PROCEDURE — 86901 BLOOD TYPING SEROLOGIC RH(D): CPT

## 2023-02-01 PROCEDURE — 86900 BLOOD TYPING SEROLOGIC ABO: CPT

## 2023-02-02 ENCOUNTER — TELEPHONE (OUTPATIENT)
Dept: FAMILY MEDICINE CLINIC | Age: 23
End: 2023-02-02

## 2023-02-02 NOTE — TELEPHONE ENCOUNTER
----- Message from ABHISHEK Godfrey CNP sent at 2/2/2023  9:39 AM EST -----  Let pt know her blood type is A +

## 2023-03-04 ENCOUNTER — HOSPITAL ENCOUNTER (EMERGENCY)
Age: 23
Discharge: HOME | End: 2023-03-04
Payer: MEDICAID

## 2023-03-04 VITALS
RESPIRATION RATE: 18 BRPM | TEMPERATURE: 98.6 F | HEART RATE: 87 BPM | SYSTOLIC BLOOD PRESSURE: 136 MMHG | DIASTOLIC BLOOD PRESSURE: 91 MMHG | OXYGEN SATURATION: 100 %

## 2023-03-04 DIAGNOSIS — R13.19 ESOPHAGEAL DYSPHAGIA: Primary | ICD-10-CM

## 2023-03-04 LAB
EKG ATRIAL RATE: 86 BPM
EKG P AXIS: 83 DEGREES
EKG P-R INTERVAL: 166 MS
EKG Q-T INTERVAL: 362 MS
EKG QRS DURATION: 88 MS
EKG QTC CALCULATION (BAZETT): 433 MS
EKG R AXIS: 75 DEGREES
EKG T AXIS: 56 DEGREES
EKG VENTRICULAR RATE: 86 BPM

## 2023-03-04 PROCEDURE — 99213 OFFICE O/P EST LOW 20 MIN: CPT

## 2023-03-04 PROCEDURE — 93005 ELECTROCARDIOGRAM TRACING: CPT | Performed by: NURSE PRACTITIONER

## 2023-03-04 RX ORDER — OMEPRAZOLE 20 MG/1
20 CAPSULE, DELAYED RELEASE ORAL
Qty: 60 CAPSULE | Refills: 0 | Status: SHIPPED | OUTPATIENT
Start: 2023-03-04 | End: 2023-03-06 | Stop reason: CLARIF

## 2023-03-04 ASSESSMENT — ENCOUNTER SYMPTOMS
COUGH: 0
CHEST TIGHTNESS: 0
ABDOMINAL PAIN: 0
ABDOMINAL DISTENTION: 0
NAUSEA: 0
SORE THROAT: 0
VOMITING: 0

## 2023-03-04 ASSESSMENT — PAIN - FUNCTIONAL ASSESSMENT: PAIN_FUNCTIONAL_ASSESSMENT: NONE - DENIES PAIN

## 2023-03-04 NOTE — ED PROVIDER NOTES
[place]  Urgent Care Encounter       CHIEF COMPLAINT       Chief Complaint   Patient presents with    Chest Pain     Middle of the chest       Nurses Notes reviewed and I agree except as noted in the HPI. HISTORY OF PRESENT ILLNESS   Mikayla Atkins is a 21 y.o. female who presents with complaints of pain in the middle of her chest when swallowing food or drink. Patient states this is a new problem that started on Thursday. It has persisted and slightly worsened. She denies any pain currently. Her pain is only when swallowing. Denies any oral pain drooling, or coughing. Has no history of gastro esophageal reflux disease. Denies any shortness of breath. No palpitations. Does have a history of high blood pressure. The history is provided by the patient. REVIEW OF SYSTEMS     Review of Systems   Constitutional:  Negative for fever. HENT:  Negative for sore throat. Respiratory:  Negative for cough and chest tightness. Cardiovascular:  Positive for chest pain. Negative for palpitations. Gastrointestinal:  Negative for abdominal distention, abdominal pain, nausea and vomiting. Musculoskeletal:  Positive for myalgias. Neurological:  Negative for headaches. PAST MEDICAL HISTORY         Diagnosis Date    Asthma     Hypertension        SURGICALHISTORY     Patient  has no past surgical history on file. CURRENT MEDICATIONS       Previous Medications    AMLODIPINE (NORVASC) 10 MG TABLET    TAKE 1 TABLET BY MOUTH EVERY DAY    IBUPROFEN (IBU) 400 MG TABLET    Take 1 tablet by mouth every 6 hours as needed for Pain    LORATADINE (CLARITIN) 10 MG TABLET    TAKE 1 TABLET BY MOUTH EVERY DAY    LOSARTAN (COZAAR) 100 MG TABLET    TAKE 1 TABLET BY MOUTH EVERY DAY    MULTIPLE VITAMIN (MULTI VITAMIN DAILY PO)    Take by mouth    NORGESTIMATE-ETHINYL ESTRADIOL (ORTHO-CYCLEN, 28,) 0.25-35 MG-MCG PER TABLET    Take 1 tablet by mouth in the morning.     SALICYLIC ACID (SELSUN BLUE DEEP CLEANSING) 3 % SHAM    Apply 1 applicator topically daily    TERBINAFINE (LAMISIL) 1 % CREAM    Apply topically 2 times daily. ALLERGIES     Patient is has No Known Allergies. Patients   Immunization History   Administered Date(s) Administered    COVID-19, J&J, (age 18y+), IM, 0.5 mL 09/08/2021    DTaP (Infanrix) 2000, 2000, 2000, 04/05/2001, 09/08/2005    DTaP vaccine 2000, 2000, 2000, 04/05/2001    HIB PRP-T (ActHIB, Hiberix) 2000, 2000, 2000, 04/05/2001    HPV (Human Papilloma Virus)Vaccine 02/23/2011    HPV 9-valent Gladys Binder) 12/29/2020, 03/01/2021    HPV Quadrivalent (Gardasil) 02/23/2011, 03/25/2011, 10/04/2012    Hep B/Hib (Comvax) 2000, 2000    Hepatitis A Ped/Adol (Havrix, Vaqta) 02/23/2011, 10/04/2012    Hepatitis B Ped/Adol (Engerix-B, Recombivax HB) 2000, 2000, 04/05/2001    Hib PRP-OMP (PedvaxHIB) 04/05/2001    Hib vaccine 2000    Influenza Virus Vaccine 10/04/2012, 10/21/2013, 09/28/2020    Influenza, FLUCELVAX, (age 10 mo+), MDCK, PF, 0.5mL 09/28/2020, 09/15/2021    MMR 04/05/2001, 09/08/2005    Meningococcal MCV4P (Menactra) 02/23/2011, 07/26/2017    PPD Test 04/05/2001, 06/05/2020, 06/15/2020    Polio IPV (IPOL) 2000, 2000, 2000, 09/08/2005    Polio Virus Vaccine 2000    Tdap (Boostrix, Adacel) 02/23/2011, 02/03/2021    Varicella (Varivax) 02/23/2011, 10/04/2012       FAMILY HISTORY     Patient's family history includes High Blood Pressure in her father and mother. SOCIAL HISTORY     Patient  reports that she has never smoked. She has never used smokeless tobacco. She reports that she does not drink alcohol and does not use drugs. PHYSICAL EXAM     ED TRIAGE VITALS  BP: (!) 136/91, Temp: 98.6 °F (37 °C), Heart Rate: 87, Resp: 18, SpO2: 100 %,Estimated body mass index is 33.18 kg/m² as calculated from the following:    Height as of 10/18/22: 5' 6\" (1.676 m). Weight as of 10/18/22: 205 lb 9.6 oz (93.3 kg). ,No LMP recorded. Physical Exam  Vitals and nursing note reviewed. Constitutional:       General: She is not in acute distress. Appearance: She is not ill-appearing. Cardiovascular:      Rate and Rhythm: Normal rate and regular rhythm. Heart sounds: Normal heart sounds. No murmur heard. Pulmonary:      Effort: Pulmonary effort is normal.      Breath sounds: Normal breath sounds. Abdominal:      General: Abdomen is flat. Bowel sounds are normal. There is no distension. Palpations: Abdomen is soft. Tenderness: There is no abdominal tenderness. There is no guarding. Skin:     General: Skin is warm and dry. Neurological:      Mental Status: She is alert and oriented to person, place, and time. DIAGNOSTIC RESULTS     Labs:No results found for this visit on 03/04/23. IMAGING:  None    EKG:  None    URGENT CARE COURSE:     Vitals:    03/04/23 0948   BP: (!) 136/91   Pulse: 87   Resp: 18   Temp: 98.6 °F (37 °C)   TempSrc: Oral   SpO2: 100%       Medications - No data to display       PROCEDURES:  None    FINAL IMPRESSION      1. Esophageal dysphagia      DISPOSITION/ PLAN   DISPOSITION Decision To Discharge 03/04/2023 10:02:49 AM     Patient symptoms are consistent with esophageal dysphagia of unknown origin. We discussed treatment options. Agreed to start patient on Prilosec twice daily to see if symptoms improve. However, patient was advised she should call her PCP for further evaluation and possible referral to GI specialist if chest pain continues with swallowing. Present to the ER for any obstruction.     PATIENT REFERRED TO:  ABHISHEK Mcgovern CNP  [facility] [id number]      DISCHARGE MEDICATIONS:  New Prescriptions    OMEPRAZOLE (PRILOSEC) 20 MG DELAYED RELEASE CAPSULE    Take 1 capsule by mouth 2 times daily (before meals)       Discontinued Medications    No medications on file       Current Discharge Medication List          ABHISHEK Arreguin CNP    (Please note that portions of this note were completed with a voice recognition program. Efforts were made to edit the dictations but occasionally words are mis-transcribed.)            ABHISHEK Arreguin CNP  03/04/23 1007

## 2023-03-04 NOTE — ED NOTES
Patient presents to  with complaints of intermittent middle chest pain that started Thursday evening. Patient currently does not have any chest discomfort or SOB.  Patient states the pain comes when she swallows      Alissa Wayne RN  03/04/23 1007

## 2023-03-06 ENCOUNTER — OFFICE VISIT (OUTPATIENT)
Dept: FAMILY MEDICINE CLINIC | Age: 23
End: 2023-03-06
Payer: MEDICAID

## 2023-03-06 VITALS
RESPIRATION RATE: 16 BRPM | OXYGEN SATURATION: 97 % | TEMPERATURE: 98.1 F | WEIGHT: 198.8 LBS | HEART RATE: 80 BPM | BODY MASS INDEX: 31.95 KG/M2 | SYSTOLIC BLOOD PRESSURE: 134 MMHG | DIASTOLIC BLOOD PRESSURE: 82 MMHG | HEIGHT: 66 IN

## 2023-03-06 DIAGNOSIS — I10 PRIMARY HYPERTENSION: ICD-10-CM

## 2023-03-06 DIAGNOSIS — R13.19 ESOPHAGEAL DYSPHAGIA: Primary | ICD-10-CM

## 2023-03-06 PROCEDURE — 99213 OFFICE O/P EST LOW 20 MIN: CPT | Performed by: NURSE PRACTITIONER

## 2023-03-06 PROCEDURE — 3075F SYST BP GE 130 - 139MM HG: CPT | Performed by: NURSE PRACTITIONER

## 2023-03-06 PROCEDURE — 3079F DIAST BP 80-89 MM HG: CPT | Performed by: NURSE PRACTITIONER

## 2023-03-06 RX ORDER — NAPROXEN 375 MG/1
375 TABLET ORAL 2 TIMES DAILY WITH MEALS
Qty: 180 TABLET | Refills: 1 | Status: SHIPPED | OUTPATIENT
Start: 2023-03-06

## 2023-03-06 SDOH — ECONOMIC STABILITY: HOUSING INSECURITY
IN THE LAST 12 MONTHS, WAS THERE A TIME WHEN YOU DID NOT HAVE A STEADY PLACE TO SLEEP OR SLEPT IN A SHELTER (INCLUDING NOW)?: NO

## 2023-03-06 SDOH — ECONOMIC STABILITY: INCOME INSECURITY: HOW HARD IS IT FOR YOU TO PAY FOR THE VERY BASICS LIKE FOOD, HOUSING, MEDICAL CARE, AND HEATING?: NOT HARD AT ALL

## 2023-03-06 SDOH — ECONOMIC STABILITY: FOOD INSECURITY: WITHIN THE PAST 12 MONTHS, YOU WORRIED THAT YOUR FOOD WOULD RUN OUT BEFORE YOU GOT MONEY TO BUY MORE.: NEVER TRUE

## 2023-03-06 SDOH — ECONOMIC STABILITY: FOOD INSECURITY: WITHIN THE PAST 12 MONTHS, THE FOOD YOU BOUGHT JUST DIDN'T LAST AND YOU DIDN'T HAVE MONEY TO GET MORE.: NEVER TRUE

## 2023-03-06 ASSESSMENT — ENCOUNTER SYMPTOMS
VOMITING: 0
ABDOMINAL PAIN: 0
ABDOMINAL DISTENTION: 0
RESPIRATORY NEGATIVE: 1
CONSTIPATION: 0
NAUSEA: 0
DIARRHEA: 0

## 2023-03-06 ASSESSMENT — PATIENT HEALTH QUESTIONNAIRE - PHQ9
1. LITTLE INTEREST OR PLEASURE IN DOING THINGS: 0
SUM OF ALL RESPONSES TO PHQ QUESTIONS 1-9: 0
SUM OF ALL RESPONSES TO PHQ9 QUESTIONS 1 & 2: 0
SUM OF ALL RESPONSES TO PHQ QUESTIONS 1-9: 0
DEPRESSION UNABLE TO ASSESS: FUNCTIONAL CAPACITY MOTIVATION LIMITS ACCURACY
SUM OF ALL RESPONSES TO PHQ QUESTIONS 1-9: 0
SUM OF ALL RESPONSES TO PHQ QUESTIONS 1-9: 0
2. FEELING DOWN, DEPRESSED OR HOPELESS: 0

## 2023-03-06 NOTE — PROGRESS NOTES
[facility]  [address] [place] [address]  Dept: [phone]  Dept Fax: [phone]  Loc: [phone]    Kalen Philip is a 21 y.o. femalewho presents today for her medical conditions/complaints as noted below. Meri ALESHIA Rodriguez c/o of Follow-up (Chest pain when eating and drinking started 3.2.2023)      HPI:      Kalen Philip is a 21 y.o. female who presents with complaints of pain in the middle of her chest when swallowing food or drink. Patient states this is a new problem that started on Thursday. It has persisted and slightly worsened. She denies any pain currently. Her pain is only when swallowing. Denies any oral pain drooling, or coughing. Has no history of gastro esophageal reflux disease. Denies any shortness of breath. No palpitations. Does have a history of high blood pressure. Pt presented to the  over the weekend for this and had a normal EKG, did have a normal chest x-ray in 10/2022 no hiatal hernia noted. Pt states she took a few prilsoec capsules but it did not help so she stopped it. Denies acid reflux,     HTN    Does patient check BP regularly at home? - No  Current Medication regimen - norvasc  Tolerating medications well? - yes    Shortness of breath or chest pain? No  Headache or visual complaints? No  Neurologic changes like confusion? No  Extremity edema?  No    BP Readings from Last 3 Encounters:  03/06/23 : 134/82  03/04/23 : (!) 136/91  11/03/22 : 126/82            Current Outpatient Medications   Medication Sig Dispense Refill    Probiotic Product (PROBIOTIC PEARLS WOMENS PO) Take by mouth      naproxen (NAPROSYN) 375 MG tablet Take 1 tablet by mouth 2 times daily (with meals) 180 tablet 1    amLODIPine (NORVASC) 10 MG tablet TAKE 1 TABLET BY MOUTH EVERY DAY 90 tablet 3    losartan (COZAAR) 100 MG tablet TAKE 1 TABLET BY MOUTH EVERY DAY 90 tablet 4    Multiple Vitamin (MULTI VITAMIN DAILY PO) Take by mouth       No current facility-administered medications for this visit. Past Medical History:   Diagnosis Date    Asthma     Hypertension       History reviewed. No pertinent surgical history. Family History   Problem Relation Age of Onset    High Blood Pressure Mother     High Blood Pressure Father      Social History     Tobacco Use    Smoking status: Never    Smokeless tobacco: Never   Substance Use Topics    Alcohol use: No        No Known Allergies    Health Maintenance   Topic Date Due    COVID-19 Vaccine (2 - Booster for Azeem series) 11/03/2021    Depression Screen  08/16/2023    Chlamydia/GC screen  10/01/2023    Pap smear  03/10/2025    DTaP/Tdap/Td vaccine (8 - Td or Tdap) 02/03/2031    Hepatitis A vaccine  Completed    Hib vaccine  Completed    HPV vaccine  Completed    Meningococcal (ACWY) vaccine  Completed    Flu vaccine  Completed    Hepatitis C screen  Completed    HIV screen  Completed    Pneumococcal 0-64 years Vaccine  Aged Out    Varicella vaccine  Discontinued       Subjective:      Review of Systems   Constitutional:  Negative for unexpected weight change. Respiratory: Negative. Cardiovascular: Negative. Gastrointestinal:  Negative for abdominal distention, abdominal pain, constipation, diarrhea, nausea and vomiting. Esophageal dysphagia, no vomiting or N/D   Skin: Negative. Psychiatric/Behavioral:  Negative for self-injury, sleep disturbance and suicidal ideas. Objective:      /82   Pulse 80   Temp 98.1 °F (36.7 °C)   Resp 16   Ht 5' 6\" (1.676 m)   Wt 198 lb 12.8 oz (90.2 kg)   LMP 02/15/2023 (Approximate)   SpO2 97%   BMI 32.09 kg/m²      Physical Exam  Vitals and nursing note reviewed. Constitutional:       Appearance: She is not ill-appearing. HENT:      Nose: Nose normal.      Mouth/Throat:      Mouth: Mucous membranes are moist.   Cardiovascular:      Rate and Rhythm: Normal rate and regular rhythm.       Pulses: Normal pulses. Heart sounds: Normal heart sounds. No murmur heard. Pulmonary:      Effort: Pulmonary effort is normal. No respiratory distress. Breath sounds: Normal breath sounds. No wheezing. Abdominal:      General: Abdomen is flat. Bowel sounds are normal. There is no distension. Palpations: Abdomen is soft. Tenderness: There is no abdominal tenderness. Skin:     General: Skin is warm and dry. Capillary Refill: Capillary refill takes less than 2 seconds. Neurological:      Mental Status: She is alert. Assessment/Plan:           1. Esophageal dysphagia  R/o hiatal hernia vs narrowed esophagus vs neoplasm of esophagus  CLARE diet  Restart prilosec  Avoid hard foods, soft foods until seen by GI  EKG reviewed and chest pain not likely cardiac R/T   - Ambulatory referral to Gastroenterology    2. Primary hypertension  Controlled with norvasc 10 mg daily   continue      Return if symptoms worsen or fail to improve. Reccommended tobaccocessation options including pharmacologic methods, counseled great than 3 minutesduring this visit:  Yes[]  No  []       Patient given educational materials -see patient instructions. Discussed use, benefit, and side effects of prescribedmedications. All patient questions answered. Pt voiced understanding. Reviewedhealth maintenance. Instructed to continue current medications, diet and exercise. Patient agreed with treatment plan. Follow up as directed.        Electronicallysigned by ABHISHEK Bolton CNP on 3/6/2023 at 8:05 AM

## 2023-03-29 ENCOUNTER — TELEPHONE (OUTPATIENT)
Dept: FAMILY MEDICINE CLINIC | Age: 23
End: 2023-03-29

## 2023-03-29 DIAGNOSIS — Z30.41 SURVEILLANCE FOR BIRTH CONTROL, ORAL CONTRACEPTIVES: ICD-10-CM

## 2023-03-29 RX ORDER — NORGESTIMATE AND ETHINYL ESTRADIOL 0.25-0.035
KIT ORAL
Qty: 28 TABLET | Refills: 11 | OUTPATIENT
Start: 2023-03-29

## 2023-03-29 NOTE — TELEPHONE ENCOUNTER
----- Message from Radha Wang sent at 3/29/2023  1:15 PM EDT -----  Subject: Appointment Request    Reason for Call: Established Patient Appointment needed: Flu Shot    QUESTIONS    Reason for appointment request? No appointments available during search     Additional Information for Provider? pt didn't get 1 last year  ---------------------------------------------------------------------------  --------------  [id number]  [phone]; OK to leave message on voicemail  ---------------------------------------------------------------------------  --------------  SCRIPT ANSWERS  COVID Screen: Pablo Rosado

## 2023-03-29 NOTE — TELEPHONE ENCOUNTER
Patient has been informed that she received a flu vaccine on 09.30.2022 at the [address] [place] [address] and it would be too early to get another flu vaccine

## 2023-06-23 ENCOUNTER — HOSPITAL ENCOUNTER (EMERGENCY)
Age: 23
Discharge: HOME | End: 2023-06-23
Payer: MEDICAID

## 2023-06-23 VITALS
OXYGEN SATURATION: 100 % | DIASTOLIC BLOOD PRESSURE: 93 MMHG | SYSTOLIC BLOOD PRESSURE: 155 MMHG | TEMPERATURE: 97.8 F | HEART RATE: 87 BPM | RESPIRATION RATE: 18 BRPM

## 2023-06-23 DIAGNOSIS — H60.501 ACUTE OTITIS EXTERNA OF RIGHT EAR, UNSPECIFIED TYPE: Primary | ICD-10-CM

## 2023-06-23 PROCEDURE — 99213 OFFICE O/P EST LOW 20 MIN: CPT

## 2023-06-23 RX ORDER — NEOMYCIN SULFATE, POLYMYXIN B SULFATE AND HYDROCORTISONE 10; 3.5; 1 MG/ML; MG/ML; [USP'U]/ML
4 SUSPENSION/ DROPS AURICULAR (OTIC) 3 TIMES DAILY
Qty: 1 EACH | Refills: 0 | Status: SHIPPED | OUTPATIENT
Start: 2023-06-23 | End: 2023-06-30

## 2023-06-23 ASSESSMENT — ENCOUNTER SYMPTOMS
VOMITING: 0
CHEST TIGHTNESS: 0
SHORTNESS OF BREATH: 0
STRIDOR: 0
CHOKING: 0
RHINORRHEA: 0
DIARRHEA: 0
COUGH: 0
ABDOMINAL PAIN: 0
APNEA: 0
SORE THROAT: 0
WHEEZING: 0

## 2023-06-23 ASSESSMENT — PAIN DESCRIPTION - LOCATION: LOCATION: EAR

## 2023-06-23 ASSESSMENT — PAIN - FUNCTIONAL ASSESSMENT: PAIN_FUNCTIONAL_ASSESSMENT: 0-10

## 2023-06-23 ASSESSMENT — PAIN DESCRIPTION - PAIN TYPE: TYPE: ACUTE PAIN

## 2023-06-23 ASSESSMENT — PAIN SCALES - GENERAL: PAINLEVEL_OUTOF10: 7

## 2023-06-23 ASSESSMENT — PAIN DESCRIPTION - DESCRIPTORS: DESCRIPTORS: ACHING

## 2023-06-23 ASSESSMENT — PAIN DESCRIPTION - ORIENTATION: ORIENTATION: RIGHT

## 2023-06-23 NOTE — ED PROVIDER NOTES
Medication List as of 6/23/2023  5:51 PM        CONTINUE these medications which have NOT CHANGED    Details   omeprazole (PRILOSEC) 20 MG delayed release capsule Take 20 mg by mouth 2 times dailyHistorical Med      Probiotic Product (PROBIOTIC PEARLS WOMENS PO) Take by mouthHistorical Med      amLODIPine (NORVASC) 10 MG tablet TAKE 1 TABLET BY MOUTH EVERY DAY, Disp-90 tablet, R-3Normal      losartan (COZAAR) 100 MG tablet TAKE 1 TABLET BY MOUTH EVERY DAY, Disp-90 tablet, R-4Normal      Multiple Vitamin (MULTI VITAMIN DAILY PO) Take by mouthHistorical Med             ALLERGIES     Patient is has No Known Allergies. FAMILY HISTORY     Patient's family history includes Colon Polyps in her mother; High Blood Pressure in her father and mother. SOCIAL HISTORY     Patient  reports that she has never smoked. She has never used smokeless tobacco. She reports that she does not drink alcohol and does not use drugs. PHYSICAL EXAM     ED TRIAGE VITALS  BP: (!) 155/93, Temp: 97.8 °F (36.6 °C), Pulse: 87, Respirations: 18, SpO2: 100 %  Physical Exam  Vitals and nursing note reviewed. Constitutional:       General: She is not in acute distress. Appearance: Normal appearance. She is obese. She is not ill-appearing, toxic-appearing or diaphoretic. HENT:      Head: Normocephalic and atraumatic. Right Ear: External ear normal. Swelling and tenderness present. Left Ear: Hearing, tympanic membrane, ear canal and external ear normal.      Nose: Nose normal. No congestion or rhinorrhea. Mouth/Throat:      Mouth: Mucous membranes are moist.      Pharynx: No oropharyngeal exudate or posterior oropharyngeal erythema. Eyes:      General:         Right eye: No discharge. Left eye: No discharge. Extraocular Movements: Extraocular movements intact.       Conjunctiva/sclera: Conjunctivae normal.   Pulmonary:      Effort: Pulmonary effort is normal.   Musculoskeletal:         General: Normal range of

## 2023-07-26 ENCOUNTER — HOSPITAL ENCOUNTER (EMERGENCY)
Age: 23
Discharge: HOME | End: 2023-07-26
Payer: MEDICAID

## 2023-07-26 VITALS
OXYGEN SATURATION: 99 % | TEMPERATURE: 97.4 F | HEIGHT: 67 IN | HEART RATE: 86 BPM | SYSTOLIC BLOOD PRESSURE: 144 MMHG | WEIGHT: 205 LBS | BODY MASS INDEX: 32.18 KG/M2 | RESPIRATION RATE: 16 BRPM | DIASTOLIC BLOOD PRESSURE: 86 MMHG

## 2023-07-26 DIAGNOSIS — H60.63 CHRONIC OTITIS EXTERNA OF BOTH EARS, UNSPECIFIED TYPE: Primary | ICD-10-CM

## 2023-07-26 PROCEDURE — 99213 OFFICE O/P EST LOW 20 MIN: CPT

## 2023-07-26 PROCEDURE — 99213 OFFICE O/P EST LOW 20 MIN: CPT | Performed by: NURSE PRACTITIONER

## 2023-07-26 RX ORDER — PREDNISONE 10 MG/1
10 TABLET ORAL 2 TIMES DAILY
Qty: 10 TABLET | Refills: 0 | Status: SHIPPED | OUTPATIENT
Start: 2023-07-26 | End: 2023-07-31

## 2023-07-26 RX ORDER — CIPROFLOXACIN AND DEXAMETHASONE 3; 1 MG/ML; MG/ML
4 SUSPENSION/ DROPS AURICULAR (OTIC) 2 TIMES DAILY
Qty: 1 EACH | Refills: 0 | Status: SHIPPED | OUTPATIENT
Start: 2023-07-26 | End: 2023-08-02

## 2023-07-26 ASSESSMENT — ENCOUNTER SYMPTOMS
CHEST TIGHTNESS: 0
VOMITING: 0
APNEA: 0
COUGH: 0
RHINORRHEA: 0
WHEEZING: 0
CHOKING: 0
ABDOMINAL PAIN: 0
STRIDOR: 0
SORE THROAT: 0
DIARRHEA: 0
SHORTNESS OF BREATH: 0

## 2023-07-26 ASSESSMENT — PAIN DESCRIPTION - ORIENTATION: ORIENTATION: RIGHT

## 2023-07-26 ASSESSMENT — PAIN DESCRIPTION - LOCATION: LOCATION: EAR

## 2023-07-26 ASSESSMENT — PAIN - FUNCTIONAL ASSESSMENT: PAIN_FUNCTIONAL_ASSESSMENT: 0-10

## 2023-07-26 ASSESSMENT — PAIN SCALES - GENERAL: PAINLEVEL_OUTOF10: 3

## 2023-07-26 NOTE — ED TRIAGE NOTES
To rom 2 c/o right ear pain  pt reports seen for seen recently here and prescribed droops ( neomycin polymycin HC)  Ear paib restarted 2 weeks ago  pt was seen 7/12/23  did not fu with PCP

## 2023-07-26 NOTE — ED PROVIDER NOTES
[facility]  Urgent Care Encounter      CHIEF COMPLAINT       Chief Complaint   Patient presents with    Otalgia     Right  Pt reports \" My ear started hurting again. Was seen for same 2 weeks ago  and got drops\"  Pt denies follow up with PCP as instructed       Nurses Notes reviewed and I agree except as noted in the HPI. HISTORY OFPRESENT ILLNESS   Meri ALESHIA Leal is a 21 y.o. The history is provided by the patient. No  was used. Ear Problem  Location:  Right  Behind ear:  Swelling  Quality:  Aching  Severity:  Moderate  Onset quality:  Unable to specify  Duration:  1 month  Timing:  Intermittent  Progression:  Waxing and waning  Chronicity:  Chronic (initially treated a month ago)  Context: not direct blow, not elevation change, not foreign body in ear, not loud noise, not recent URI and not water in ear    Relieved by:  Nothing  Worsened by:  Nothing  Ineffective treatments: rx drops. Associated symptoms: hearing loss    Associated symptoms: no abdominal pain, no congestion, no cough, no diarrhea, no ear discharge, no fever, no headaches, no neck pain, no rash, no rhinorrhea, no sore throat, no tinnitus and no vomiting    Risk factors: no recent travel, no chronic ear infection and no prior ear surgery      REVIEW OF SYSTEMS     Review of Systems   Constitutional:  Negative for activity change, appetite change, chills, diaphoresis, fatigue and fever. HENT:  Positive for ear pain and hearing loss. Negative for congestion, ear discharge, rhinorrhea, sore throat and tinnitus. Respiratory:  Negative for apnea, cough, choking, chest tightness, shortness of breath, wheezing and stridor. Cardiovascular:  Negative for chest pain, palpitations and leg swelling. Gastrointestinal:  Negative for abdominal pain, diarrhea and vomiting. Musculoskeletal:  Negative for neck pain. Skin:  Negative for rash.    Neurological:  Negative for dizziness, Addressed, gave to Bernarda PETIT

## 2023-07-26 NOTE — ED NOTES
BIlateral ear flush with warm water moderate amount white substance returned with flush     Angelica Kwon RN  07/26/23 [id number]

## 2023-08-01 ENCOUNTER — NURSE ONLY (OUTPATIENT)
Dept: LAB | Age: 23
End: 2023-08-01

## 2023-08-03 LAB
C. TRACHOMATIS DNA,THIN PREP: NEGATIVE
N. GONORRHOEAE DNA, THIN PREP: NEGATIVE
SOURCE: NORMAL

## 2023-08-05 LAB
SPEC CONTAINER SPEC: NORMAL
SPECIMEN SOURCE: NORMAL
T VAGINALIS RRNA SPEC QL NAA+PROBE: NEGATIVE

## 2023-08-08 LAB — CYTOLOGY THIN PREP PAP: NORMAL

## 2023-09-06 RX ORDER — NAPROXEN 375 MG/1
TABLET ORAL
Qty: 60 TABLET | Refills: 5 | Status: SHIPPED | OUTPATIENT
Start: 2023-09-06

## 2023-09-06 NOTE — TELEPHONE ENCOUNTER
Recent Visits  Date Type Provider Dept   03/06/23 Office Visit Iza ManriquezABHISHEK - CNP [facility]   10/18/22 Office Visit Iza ManriquezABHISHEK - CNP [facility]   08/16/22 Office Visit Iza Manriquez, ABHISHEK - CNP [facility]   06/13/22 Office Visit Iza Manriquez, ABHISHEK - CNP [facility]   05/18/22 Office Visit Iza ManriquezABHISHEK - CNP [facility]   Showing recent visits within past 540 days with a meds authorizing provider and meeting all other requirements  Future Appointments  No visits were found meeting these conditions. Showing future appointments within next 150 days with a meds authorizing provider and meeting all other requirements   No future appointments.

## 2024-01-11 ENCOUNTER — NURSE ONLY (OUTPATIENT)
Dept: LAB | Age: 24
End: 2024-01-11

## 2024-01-13 LAB
BACTERIA UR CULT: ABNORMAL
BACTERIA UR CULT: ABNORMAL
ORGANISM: ABNORMAL
ORGANISM: ABNORMAL
SPEC CONTAINER SPEC: NORMAL
SPECIMEN SOURCE: NORMAL
SPECIMEN SOURCE: NORMAL

## 2024-01-18 LAB
C TRACH RRNA SPEC QL NAA+PROBE: NEGATIVE
N GONORRHOEA RRNA SPEC QL NAA+PROBE: NEGATIVE
SPEC CONTAINER SPEC: NORMAL
SPECIMEN SOURCE: NORMAL
SPECIMEN SOURCE: NORMAL
T VAGINALIS RRNA SPEC QL NAA+PROBE: NEGATIVE

## 2024-04-22 ENCOUNTER — HOSPITAL ENCOUNTER (EMERGENCY)
Age: 24
Discharge: HOME | End: 2024-04-22
Payer: COMMERCIAL

## 2024-04-22 VITALS
HEART RATE: 82 BPM | TEMPERATURE: 98.1 F | SYSTOLIC BLOOD PRESSURE: 154 MMHG | WEIGHT: 201 LBS | BODY MASS INDEX: 31.48 KG/M2 | RESPIRATION RATE: 16 BRPM | OXYGEN SATURATION: 100 % | DIASTOLIC BLOOD PRESSURE: 108 MMHG

## 2024-04-22 DIAGNOSIS — I10 ELEVATED BLOOD PRESSURE READING WITH DIAGNOSIS OF HYPERTENSION: ICD-10-CM

## 2024-04-22 DIAGNOSIS — H65.192 OTHER NON-RECURRENT ACUTE NONSUPPURATIVE OTITIS MEDIA OF LEFT EAR: Primary | ICD-10-CM

## 2024-04-22 PROCEDURE — 99213 OFFICE O/P EST LOW 20 MIN: CPT

## 2024-04-22 RX ORDER — CIPROFLOXACIN AND DEXAMETHASONE 3; 1 MG/ML; MG/ML
4 SUSPENSION/ DROPS AURICULAR (OTIC) 2 TIMES DAILY
Qty: 7.5 ML | Refills: 0 | Status: SHIPPED | OUTPATIENT
Start: 2024-04-22 | End: 2024-04-29

## 2024-04-22 ASSESSMENT — PAIN SCALES - GENERAL: PAINLEVEL_OUTOF10: 8

## 2024-04-22 ASSESSMENT — PAIN - FUNCTIONAL ASSESSMENT
PAIN_FUNCTIONAL_ASSESSMENT: 0-10
PAIN_FUNCTIONAL_ASSESSMENT: ACTIVITIES ARE NOT PREVENTED

## 2024-04-22 ASSESSMENT — PAIN DESCRIPTION - ORIENTATION: ORIENTATION: LEFT

## 2024-04-22 ASSESSMENT — PAIN DESCRIPTION - LOCATION: LOCATION: EAR

## 2024-04-22 ASSESSMENT — PAIN DESCRIPTION - FREQUENCY: FREQUENCY: INTERMITTENT

## 2024-04-22 NOTE — ED PROVIDER NOTES
[facility] URGENT CARE  Urgent Care Encounter       CHIEF COMPLAINT       Chief Complaint   Patient presents with    Otalgia       Nurses Notes reviewed and I agree except as noted in the HPI.  HISTORY OF PRESENT ILLNESS   Meri Torres is a 24 y.o. female who presents with concerns of ear pain for the past four days. Reports no use of medications for symptom management.     Patient reports she has not taken her blood pressure medications, amlodipine nor losartan as she is prescribed and instructed to take in mornings.     HPI    REVIEW OF SYSTEMS     Review of Systems   Constitutional:  Negative for fatigue and fever.   HENT:  Positive for ear pain (left).    All other systems reviewed and are negative.      PAST MEDICAL HISTORY         Diagnosis Date    Asthma     Hypertension        SURGICALHISTORY     Patient  has no past surgical history on file.    CURRENT MEDICATIONS       Discharge Medication List as of 4/22/2024  7:54 PM        CONTINUE these medications which have NOT CHANGED    Details   naproxen (NAPROSYN) 375 MG tablet TAKE 1 TABLET BY MOUTH TWICE A DAY WITH MEALS, Disp-60 tablet, R-5Normal      Probiotic Product (PROBIOTIC PEARLS WOMENS PO) Take by mouthHistorical Med      amLODIPine (NORVASC) 10 MG tablet TAKE 1 TABLET BY MOUTH EVERY DAY, Disp-90 tablet, R-3Normal      losartan (COZAAR) 100 MG tablet TAKE 1 TABLET BY MOUTH EVERY DAY, Disp-90 tablet, R-4Normal      Multiple Vitamin (MULTI VITAMIN DAILY PO) Take by mouthHistorical Med             ALLERGIES     Patient is has No Known Allergies.    Patients   Immunization History   Administered Date(s) Administered    COVID-19, J&J, (age 18y+), IM, 0.5 mL 09/08/2021    DTaP vaccine 2000, 2000, 2000, 04/05/2001    DTaP, INFANRIX, (age 6w-6y), IM, 0.5mL 2000, 2000, 2000, 04/05/2001, 09/08/2005    HPV (Human Papilloma Virus)Vaccine 02/23/2011    HPV Quadrivalent (Gardasil) 02/23/2011, 03/25/2011,

## 2024-04-22 NOTE — DISCHARGE INSTRUCTIONS
Ciprodex as prescribed.  BP medications as prescribed.   Increase water intake, frequent hand washing.  Tylenol / Ibuprofen as needed for fever and or pain.  Follow up with PCP in 3-5 days if no improvement or sooner with worsening symptoms.

## 2024-05-13 ENCOUNTER — HOSPITAL ENCOUNTER (EMERGENCY)
Age: 24
Discharge: HOME | End: 2024-05-13
Payer: COMMERCIAL

## 2024-05-13 VITALS
BODY MASS INDEX: 33.27 KG/M2 | HEART RATE: 84 BPM | TEMPERATURE: 99.3 F | DIASTOLIC BLOOD PRESSURE: 93 MMHG | HEIGHT: 66 IN | OXYGEN SATURATION: 100 % | WEIGHT: 207 LBS | SYSTOLIC BLOOD PRESSURE: 136 MMHG | RESPIRATION RATE: 16 BRPM

## 2024-05-13 DIAGNOSIS — H57.89 IRRITATION OF RIGHT EYE: Primary | ICD-10-CM

## 2024-05-13 PROCEDURE — 99213 OFFICE O/P EST LOW 20 MIN: CPT

## 2024-05-13 PROCEDURE — 99212 OFFICE O/P EST SF 10 MIN: CPT

## 2024-05-13 ASSESSMENT — ENCOUNTER SYMPTOMS
EYE DISCHARGE: 0
EYE PAIN: 1
PHOTOPHOBIA: 0
EYE ITCHING: 0
EYE REDNESS: 0

## 2024-05-13 ASSESSMENT — PAIN - FUNCTIONAL ASSESSMENT: PAIN_FUNCTIONAL_ASSESSMENT: NONE - DENIES PAIN

## 2024-05-13 NOTE — ED PROVIDER NOTES
[facility] URGENT CARE  Urgent Care Encounter       CHIEF COMPLAINT       Chief Complaint   Patient presents with    Eye Problem     Right eye        Nurses Notes reviewed and I agree except as noted in the HPI.  HISTORY OF PRESENT ILLNESS   Meri Torres is a 24 y.o. female who presents with right eye irritation. Reports was outside earlier today, neighbor was mowing and feels like a blade of grass or \"something\" flew into eye. Reports flushed eye while at home prior to arrival that did improve irritation.     HPI    REVIEW OF SYSTEMS     Review of Systems   Eyes:  Positive for pain (\"irritation\"). Negative for photophobia, discharge, redness, itching and visual disturbance.   All other systems reviewed and are negative.      PAST MEDICAL HISTORY         Diagnosis Date    Asthma     Hypertension        SURGICALHISTORY     Patient  has no past surgical history on file.    CURRENT MEDICATIONS       Previous Medications    AMLODIPINE (NORVASC) 10 MG TABLET    TAKE 1 TABLET BY MOUTH EVERY DAY    LOSARTAN (COZAAR) 100 MG TABLET    TAKE 1 TABLET BY MOUTH EVERY DAY    MULTIPLE VITAMIN (MULTI VITAMIN DAILY PO)    Take by mouth    NAPROXEN (NAPROSYN) 375 MG TABLET    TAKE 1 TABLET BY MOUTH TWICE A DAY WITH MEALS    PROBIOTIC PRODUCT (PROBIOTIC PEARLS WOMENS PO)    Take by mouth       ALLERGIES     Patient is has No Known Allergies.    Patients   Immunization History   Administered Date(s) Administered    COVID-19, J&J, (age 18y+), IM, 0.5 mL 09/08/2021    DTaP vaccine 2000, 2000, 2000, 04/05/2001    DTaP, INFANRIX, (age 6w-6y), IM, 0.5mL 2000, 2000, 2000, 04/05/2001, 09/08/2005    HPV (Human Papilloma Virus)Vaccine 02/23/2011    HPV Quadrivalent (Gardasil) 02/23/2011, 03/25/2011, 10/04/2012    HPV, GARDASIL 9, (age 9y-45y), IM, 0.5mL 12/29/2020, 03/01/2021    Hep A, HAVRIX, VAQTA, (age 12m-18y), IM, 0.5mL 02/23/2011, 10/04/2012    Hep B, ENGERIX-B, RECOMBIVAX-HB, (age

## 2024-05-13 NOTE — DISCHARGE INSTRUCTIONS
Cool compress as needed.  Tylenol / Ibuprofen as needed for fever and or pain.  Follow up with PCP in 3-5 days if no improvement or sooner with worsening symptoms.